# Patient Record
Sex: FEMALE | Race: WHITE | NOT HISPANIC OR LATINO | Employment: OTHER | URBAN - METROPOLITAN AREA
[De-identification: names, ages, dates, MRNs, and addresses within clinical notes are randomized per-mention and may not be internally consistent; named-entity substitution may affect disease eponyms.]

---

## 2023-02-03 PROBLEM — E78.5 HYPERLIPIDEMIA: Status: ACTIVE | Noted: 2023-02-03

## 2023-02-03 PROBLEM — E78.00 HYPERCHOLESTEREMIA: Status: ACTIVE | Noted: 2023-02-03

## 2023-02-03 PROBLEM — H81.4 VERTIGO OF CENTRAL ORIGIN, UNSPECIFIED LATERALITY: Status: ACTIVE | Noted: 2023-02-03

## 2023-02-03 PROBLEM — B35.1 ONYCHOMYCOSIS OF TOENAIL: Status: ACTIVE | Noted: 2023-02-03

## 2023-02-03 PROBLEM — M50.90 CERVICAL DISC DISEASE: Status: ACTIVE | Noted: 2023-02-03

## 2023-02-03 PROBLEM — F41.9 ANXIETY: Status: ACTIVE | Noted: 2023-02-03

## 2023-02-03 PROBLEM — R53.83 OTHER FATIGUE: Status: ACTIVE | Noted: 2023-02-03

## 2023-02-03 PROBLEM — F51.04 PSYCHOPHYSIOLOGICAL INSOMNIA: Status: ACTIVE | Noted: 2023-02-03

## 2023-02-03 PROBLEM — H51.9 EYE MOVEMENT ABNORMALITY: Status: ACTIVE | Noted: 2023-02-03

## 2023-02-03 PROBLEM — R74.01 ELEVATED TRANSAMINASE LEVEL: Status: ACTIVE | Noted: 2023-02-03

## 2023-02-03 PROBLEM — H93.3X3: Status: ACTIVE | Noted: 2023-02-03

## 2023-02-03 PROBLEM — E11.9 DIABETES MELLITUS (MULTI): Status: ACTIVE | Noted: 2023-02-03

## 2023-02-03 PROBLEM — H49.883: Status: ACTIVE | Noted: 2023-02-03

## 2023-02-03 PROBLEM — H53.002 LAZY EYE OF LEFT SIDE: Status: ACTIVE | Noted: 2023-02-03

## 2023-02-03 PROBLEM — H25.13 CATARACT, NUCLEAR SCLEROTIC, BOTH EYES: Status: ACTIVE | Noted: 2023-02-03

## 2023-02-03 PROBLEM — K76.0 NONALCOHOLIC FATTY LIVER DISEASE: Status: ACTIVE | Noted: 2023-02-03

## 2023-02-03 PROBLEM — I25.10 ATHEROSCLEROTIC HEART DISEASE OF NATIVE CORONARY ARTERY WITHOUT ANGINA PECTORIS: Status: ACTIVE | Noted: 2023-02-03

## 2023-02-03 PROBLEM — R42 LIGHTHEADEDNESS: Status: ACTIVE | Noted: 2023-02-03

## 2023-02-03 PROBLEM — E83.52 SERUM CALCIUM ELEVATED: Status: ACTIVE | Noted: 2023-02-03

## 2023-02-03 PROBLEM — N76.4 BOIL, LABIUM: Status: ACTIVE | Noted: 2023-02-03

## 2023-02-03 PROBLEM — I10 ESSENTIAL HYPERTENSION, BENIGN: Status: ACTIVE | Noted: 2023-02-03

## 2023-02-03 PROBLEM — L02.215 PERINEAL ABSCESS: Status: ACTIVE | Noted: 2023-02-03

## 2023-02-03 PROBLEM — C69.92: Status: ACTIVE | Noted: 2023-02-03

## 2023-02-03 PROBLEM — H61.22 IMPACTED CERUMEN OF LEFT EAR: Status: ACTIVE | Noted: 2023-02-03

## 2023-02-03 PROBLEM — E87.5 HYPERKALEMIA: Status: ACTIVE | Noted: 2023-02-03

## 2023-02-03 PROBLEM — R74.8 ABNORMAL LIVER ENZYMES: Status: ACTIVE | Noted: 2023-02-03

## 2023-02-03 PROBLEM — G93.9: Status: ACTIVE | Noted: 2023-02-03

## 2023-02-03 PROBLEM — Z85.840 HISTORY OF MALIGNANT MELANOMA OF EYE: Status: ACTIVE | Noted: 2023-02-03

## 2023-02-03 PROBLEM — D49.6: Status: ACTIVE | Noted: 2023-02-03

## 2023-02-03 PROBLEM — E03.9 ACQUIRED HYPOTHYROIDISM: Status: ACTIVE | Noted: 2023-02-03

## 2023-02-03 PROBLEM — H52.203 ASTIGMATISM OF BOTH EYES: Status: ACTIVE | Noted: 2023-02-03

## 2023-02-03 PROBLEM — F41.8 DEPRESSION WITH ANXIETY: Status: ACTIVE | Noted: 2023-02-03

## 2023-02-03 PROBLEM — H52.00 HYPEROPIA: Status: ACTIVE | Noted: 2023-02-03

## 2023-02-03 PROBLEM — I10 HYPERTENSION: Status: ACTIVE | Noted: 2023-02-03

## 2023-02-03 PROBLEM — Z95.1 S/P CABG (CORONARY ARTERY BYPASS GRAFT): Status: ACTIVE | Noted: 2023-02-03

## 2023-02-03 RX ORDER — BLOOD SUGAR DIAGNOSTIC
STRIP MISCELLANEOUS
COMMUNITY
Start: 2022-01-27 | End: 2023-09-08 | Stop reason: SDUPTHER

## 2023-02-03 RX ORDER — METFORMIN HYDROCHLORIDE 500 MG/1
500 TABLET ORAL 2 TIMES DAILY
COMMUNITY
Start: 2019-05-21 | End: 2023-03-30 | Stop reason: SDUPTHER

## 2023-02-03 RX ORDER — DAPAGLIFLOZIN 10 MG/1
1 TABLET, FILM COATED ORAL DAILY
COMMUNITY
End: 2024-01-12 | Stop reason: SDUPTHER

## 2023-02-03 RX ORDER — LEVOTHYROXINE SODIUM 50 UG/1
50 TABLET ORAL DAILY
COMMUNITY
End: 2023-05-01 | Stop reason: SDUPTHER

## 2023-02-03 RX ORDER — LISINOPRIL 2.5 MG/1
1 TABLET ORAL DAILY
COMMUNITY
Start: 2018-09-26 | End: 2024-01-22 | Stop reason: SDUPTHER

## 2023-02-03 RX ORDER — ROSUVASTATIN CALCIUM 10 MG/1
1 TABLET, COATED ORAL NIGHTLY
COMMUNITY
Start: 2022-09-23 | End: 2023-09-18 | Stop reason: SDUPTHER

## 2023-02-03 RX ORDER — CLOPIDOGREL BISULFATE 75 MG/1
1 TABLET ORAL DAILY
COMMUNITY
Start: 2012-12-06 | End: 2023-09-18 | Stop reason: SDUPTHER

## 2023-02-03 RX ORDER — METOPROLOL SUCCINATE 25 MG/1
1 TABLET, EXTENDED RELEASE ORAL DAILY
COMMUNITY
Start: 2022-09-23 | End: 2023-03-17 | Stop reason: ALTCHOICE

## 2023-02-03 RX ORDER — ASPIRIN 81 MG/1
1 TABLET ORAL DAILY
COMMUNITY
Start: 2020-02-01 | End: 2023-03-17 | Stop reason: SINTOL

## 2023-02-03 RX ORDER — SPIRONOLACTONE 25 MG
1 TABLET ORAL DAILY
COMMUNITY

## 2023-03-17 ENCOUNTER — OFFICE VISIT (OUTPATIENT)
Dept: PRIMARY CARE | Facility: CLINIC | Age: 63
End: 2023-03-17
Payer: MEDICARE

## 2023-03-17 VITALS
HEIGHT: 55 IN | SYSTOLIC BLOOD PRESSURE: 126 MMHG | WEIGHT: 126.4 LBS | HEART RATE: 68 BPM | BODY MASS INDEX: 29.25 KG/M2 | OXYGEN SATURATION: 96 % | DIASTOLIC BLOOD PRESSURE: 70 MMHG

## 2023-03-17 DIAGNOSIS — I10 ESSENTIAL HYPERTENSION, BENIGN: ICD-10-CM

## 2023-03-17 DIAGNOSIS — E03.9 ACQUIRED HYPOTHYROIDISM: ICD-10-CM

## 2023-03-17 DIAGNOSIS — Z13.820 SCREENING FOR OSTEOPOROSIS: Primary | ICD-10-CM

## 2023-03-17 DIAGNOSIS — C69.92 MALIGNANT MELANOMA OF LEFT EYE (MULTI): ICD-10-CM

## 2023-03-17 DIAGNOSIS — Z12.31 ENCOUNTER FOR SCREENING MAMMOGRAM FOR BREAST CANCER: ICD-10-CM

## 2023-03-17 DIAGNOSIS — E11.9 TYPE 2 DIABETES MELLITUS WITHOUT COMPLICATION, WITHOUT LONG-TERM CURRENT USE OF INSULIN (MULTI): ICD-10-CM

## 2023-03-17 DIAGNOSIS — Z95.1 S/P CABG (CORONARY ARTERY BYPASS GRAFT): ICD-10-CM

## 2023-03-17 DIAGNOSIS — I25.10 ATHEROSCLEROSIS OF NATIVE CORONARY ARTERY OF NATIVE HEART WITHOUT ANGINA PECTORIS: ICD-10-CM

## 2023-03-17 DIAGNOSIS — F41.9 ANXIETY: ICD-10-CM

## 2023-03-17 DIAGNOSIS — Z78.0 ASYMPTOMATIC MENOPAUSAL STATE: ICD-10-CM

## 2023-03-17 DIAGNOSIS — G93.9 CHRONIC BRAIN DAMAGE: ICD-10-CM

## 2023-03-17 DIAGNOSIS — E78.2 MODERATE MIXED HYPERLIPIDEMIA NOT REQUIRING STATIN THERAPY: ICD-10-CM

## 2023-03-17 DIAGNOSIS — K76.0 NONALCOHOLIC FATTY LIVER DISEASE: ICD-10-CM

## 2023-03-17 DIAGNOSIS — H49.883 EXTERNAL OPHTHALMOPLEGIA OF BOTH EYES: ICD-10-CM

## 2023-03-17 DIAGNOSIS — Z00.00 ROUTINE GENERAL MEDICAL EXAMINATION AT HEALTH CARE FACILITY: ICD-10-CM

## 2023-03-17 PROBLEM — N76.4 BOIL, LABIUM: Status: RESOLVED | Noted: 2023-02-03 | Resolved: 2023-03-17

## 2023-03-17 PROBLEM — H61.22 IMPACTED CERUMEN OF LEFT EAR: Status: RESOLVED | Noted: 2023-02-03 | Resolved: 2023-03-17

## 2023-03-17 LAB — POC HEMOGLOBIN A1C: 7.2 % (ref 4.2–6.5)

## 2023-03-17 PROCEDURE — 3078F DIAST BP <80 MM HG: CPT | Performed by: NURSE PRACTITIONER

## 2023-03-17 PROCEDURE — 3074F SYST BP LT 130 MM HG: CPT | Performed by: NURSE PRACTITIONER

## 2023-03-17 PROCEDURE — 83036 HEMOGLOBIN GLYCOSYLATED A1C: CPT | Performed by: NURSE PRACTITIONER

## 2023-03-17 PROCEDURE — G0439 PPPS, SUBSEQ VISIT: HCPCS | Performed by: NURSE PRACTITIONER

## 2023-03-17 PROCEDURE — 4010F ACE/ARB THERAPY RXD/TAKEN: CPT | Performed by: NURSE PRACTITIONER

## 2023-03-17 PROCEDURE — 1036F TOBACCO NON-USER: CPT | Performed by: NURSE PRACTITIONER

## 2023-03-17 RX ORDER — DAPAGLIFLOZIN 5 MG/1
5 TABLET, FILM COATED ORAL
COMMUNITY
Start: 2022-09-10 | End: 2023-03-17 | Stop reason: ALTCHOICE

## 2023-03-17 RX ORDER — MAGNESIUM GLUCONATE 27.5 (500)
27.5 TABLET ORAL
Qty: 90 TABLET | Refills: 0 | Status: SHIPPED | OUTPATIENT
Start: 2023-03-17

## 2023-03-17 ASSESSMENT — ACTIVITIES OF DAILY LIVING (ADL)
GROCERY_SHOPPING: NEEDS ASSISTANCE
DRESSING: INDEPENDENT
BATHING: INDEPENDENT
DOING_HOUSEWORK: NEEDS ASSISTANCE
TAKING_MEDICATION: INDEPENDENT
MANAGING_FINANCES: TOTAL CARE

## 2023-03-17 ASSESSMENT — ENCOUNTER SYMPTOMS
CONSTITUTIONAL NEGATIVE: 1
ARTHRALGIAS: 1
GASTROINTESTINAL NEGATIVE: 1
DEPRESSION: 0
RESPIRATORY NEGATIVE: 1
CARDIOVASCULAR NEGATIVE: 1
LOSS OF SENSATION IN FEET: 0
OCCASIONAL FEELINGS OF UNSTEADINESS: 0
SLEEP DISTURBANCE: 1
LIGHT-HEADEDNESS: 1

## 2023-03-17 ASSESSMENT — PATIENT HEALTH QUESTIONNAIRE - PHQ9
1. LITTLE INTEREST OR PLEASURE IN DOING THINGS: NOT AT ALL
SUM OF ALL RESPONSES TO PHQ9 QUESTIONS 1 AND 2: 0
2. FEELING DOWN, DEPRESSED OR HOPELESS: NOT AT ALL

## 2023-03-17 NOTE — ASSESSMENT & PLAN NOTE
Continues to struggle with anxiety due to having multiple complex health issues.  She is very anxious about eyes

## 2023-03-17 NOTE — ASSESSMENT & PLAN NOTE
A1c improved from previous.  No changes will farxiga   Will increase metformin to one tablet twice daily  Recheck in

## 2023-03-17 NOTE — PROGRESS NOTES
Subjective   Reason for Visit: Erin Amin is an 63 y.o. female here for a Medicare Wellness visit.          Reviewed all medications by prescribing practitioner or clinical pharmacist (such as prescriptions, OTCs, herbal therapies and supplements) and documented in the medical record.    HPI    Patient Care Team:  LIV Torres-CNP as PCP - General  John Moritz, MD as PCP - [employer] Advantage PCP     Review of Systems    Objective   Vitals:  There were no vitals taken for this visit.      Physical Exam    Assessment/Plan   Problem List Items Addressed This Visit    None

## 2023-03-17 NOTE — PATIENT INSTRUCTIONS
As we discussed today continue to work on eating healthy diet continue with your fish oil.  Due to the increased issues with your vision I placed a new referral for ophthalmology with a focus on the neurological aspect of your eyes.  Please schedule appointment.      Please continue to eat a healthy low-fat low-cholesterol diet try to increase your physical activity    I have added on magnesium and sent a prescription into [facility] for you to take nightly    increase your metformin to 1 full tab twice a day your A1c did improve    Follow-up in 3 months at that time we will complete fasting blood work    You are overdue for your mammogram and bone density scan which I have placed an order for

## 2023-03-17 NOTE — PROGRESS NOTES
"Subjective   Reason for Visit: Erin Amin is an 63 y.o. female here for a Medicare Wellness visit.     Past Medical, Surgical, and Family History reviewed and updated in chart.    Reviewed all medications by prescribing practitioner or clinical pharmacist (such as prescriptions, OTCs, herbal therapies and supplements) and documented in the medical record.    Pt here for routine  medicaare wellness and follow up on diabetes. She is struggling with her vision and history of eye surgeries.        Patient Care Team:  LIV Torres-CNP as PCP - General  John Moritz, MD as PCP - [employer] Medicare Advantage PCP     Review of Systems   Constitutional: Negative.    HENT: Negative.     Eyes:  Positive for visual disturbance.   Respiratory: Negative.     Cardiovascular: Negative.    Gastrointestinal: Negative.    Genitourinary: Negative.    Musculoskeletal:  Positive for arthralgias.   Skin: Negative.    Neurological:  Positive for light-headedness.   Psychiatric/Behavioral:  Positive for sleep disturbance.        Objective   Vitals:  /70   Pulse 68   Ht 1.372 m (4' 6\")   Wt 57.3 kg (126 lb 6.4 oz)   SpO2 96%   BMI 30.48 kg/m²       Physical Exam  Vitals reviewed.   Constitutional:       Appearance: Normal appearance. She is obese.   HENT:      Head: Normocephalic.      Nose: Nose normal.   Eyes:      Conjunctiva/sclera: Conjunctivae normal.   Cardiovascular:      Rate and Rhythm: Normal rate.      Heart sounds: Normal heart sounds.   Pulmonary:      Effort: Pulmonary effort is normal.      Breath sounds: Normal breath sounds.   Abdominal:      General: Bowel sounds are normal.      Palpations: Abdomen is soft.   Musculoskeletal:         General: Normal range of motion.      Cervical back: Normal range of motion.   Skin:     General: Skin is warm and dry.      Capillary Refill: Capillary refill takes less than 2 seconds.   Neurological:      General: No focal deficit present.      Mental Status: She is " alert.   Psychiatric:      Comments: Anxious about over health         Assessment/Plan   Problem List Items Addressed This Visit          Nervous    Chronic brain damage    Current Assessment & Plan     Pt continues to be concerned about her eye sight and her balance related to past injury.          Relevant Orders    Referral to Ophthalmology    External ophthalmoplegia of both eyes    Current Assessment & Plan     She continues to struggle with issues with vision. She is frustrated in regards to her vision.     Will place new referral for ophthalmologist / neuro ophthalmologist.     Pt declines referral at this time         Relevant Orders    Referral to Ophthalmology       Circulatory    Atherosclerotic heart disease of native coronary artery without angina pectoris    Current Assessment & Plan     Pt is followed by Dr. Alford. She has had recent testing. Does routine follow up.         Essential hypertension, benign    Current Assessment & Plan     Pt is on lisinopril and stable with blood pressure         Relevant Medications    magnesium gluconate (Magonate) 27.5 mg magne- sium (500 mg) tablet    S/P CABG (coronary artery bypass graft)    Current Assessment & Plan     Follows with Cardiology            Digestive    Nonalcoholic fatty liver disease    Current Assessment & Plan     On rosuvastatin .      Pt to eat low fat low cholesterol diet            Endocrine/Metabolic    Acquired hypothyroidism    Current Assessment & Plan     Pt denies any increased fatigue or constipation or increased hair loss    No changes to medications at this time           Diabetes mellitus (CMS/HCC)    Current Assessment & Plan     A1c improved from previous.  No changes will farxiga   Will increase metformin to one tablet twice daily  Recheck in          Relevant Medications    magnesium gluconate (Magonate) 27.5 mg magne- sium (500 mg) tablet    Other Relevant Orders    POCT glycosylated hemoglobin (Hb A1C) manually resulted     Referral to Ophthalmology       Other    Anxiety    Current Assessment & Plan     Continues to struggle with anxiety due to having multiple complex health issues.  She is very anxious about eyes         Relevant Medications    magnesium gluconate (Magonate) 27.5 mg magne- sium (500 mg) tablet    Hyperlipidemia    Current Assessment & Plan     Will continue on rousovastatin. Blood work is current         Malignant melanoma of left eye (CMS/HCC)    Current Assessment & Plan     Discussed at length she is very concerned about her vision and history related to her eyes         Relevant Orders    Referral to Ophthalmology     Other Visit Diagnoses       Screening for osteoporosis    -  Primary    Relevant Orders    XR DEXA bone density    Asymptomatic menopausal state        Relevant Orders    XR DEXA bone density    Encounter for screening mammogram for breast cancer        Relevant Orders    BI mammo bilateral screening tomosynthesis    Routine general medical examination at health care facility

## 2023-03-17 NOTE — ASSESSMENT & PLAN NOTE
She continues to struggle with issues with vision. She is frustrated in regards to her vision.     Will place new referral for ophthalmologist / neuro ophthalmologist.     Pt declines referral at this time

## 2023-03-17 NOTE — ASSESSMENT & PLAN NOTE
Pt denies any increased fatigue or constipation or increased hair loss    No changes to medications at this time

## 2023-03-29 ENCOUNTER — TELEPHONE (OUTPATIENT)
Dept: PHARMACY | Facility: HOSPITAL | Age: 63
End: 2023-03-29
Payer: MEDICARE

## 2023-03-29 ENCOUNTER — TELEPHONE (OUTPATIENT)
Dept: PRIMARY CARE | Facility: CLINIC | Age: 63
End: 2023-03-29
Payer: MEDICARE

## 2023-03-29 DIAGNOSIS — E11.9 TYPE 2 DIABETES MELLITUS WITHOUT COMPLICATION, WITHOUT LONG-TERM CURRENT USE OF INSULIN (MULTI): ICD-10-CM

## 2023-03-29 NOTE — TELEPHONE ENCOUNTER
Rx Refill Request Telephone Encounter  PLEASE CALL  Name:  Erin Amin  :  [id number]  Medication Name:  METFORMIN 500mg 1 tab every day 90 day   PT is out due to updated dose   Specific Pharmacy location:  [facility]    Best number to reach patient:  [phone]

## 2023-03-29 NOTE — TELEPHONE ENCOUNTER
Patient called previously inquiring about status of this month's Farxiga medication shipment through  Home Delivery service. Stated she had enough medication to get her through Friday, 3/31.    This author looked into shipment status and scheduled 'rush' shipment for Thursday, 3/30. Autofill settings for medication have been adjusted for future medication refills.

## 2023-03-30 RX ORDER — METFORMIN HYDROCHLORIDE 500 MG/1
500 TABLET ORAL 2 TIMES DAILY
Qty: 180 TABLET | Refills: 1 | Status: SHIPPED | OUTPATIENT
Start: 2023-03-30 | End: 2023-10-03 | Stop reason: SDUPTHER

## 2023-03-30 NOTE — TELEPHONE ENCOUNTER
Pt states Ivonne has not received the refill. Pt states she needs Metformin 500mg BID (once in am and once in pm)

## 2023-05-01 ENCOUNTER — TELEPHONE (OUTPATIENT)
Dept: PRIMARY CARE | Facility: CLINIC | Age: 63
End: 2023-05-01
Payer: MEDICARE

## 2023-05-01 DIAGNOSIS — E03.9 ACQUIRED HYPOTHYROIDISM: ICD-10-CM

## 2023-05-01 RX ORDER — LEVOTHYROXINE SODIUM 50 UG/1
50 TABLET ORAL DAILY
Qty: 90 TABLET | Refills: 0 | Status: SHIPPED | OUTPATIENT
Start: 2023-05-01 | End: 2023-08-07 | Stop reason: SDUPTHER

## 2023-05-01 NOTE — TELEPHONE ENCOUNTER
Rx Refill Request Telephone Encounter    Name:  Erin Amin  :  [id number]  Medication Name:    LEVOTHYROXINE 50 MCG 50MG Q D 90 DAY SUPPLY  Specific Pharmacy location:  [facility]   Date of last appointment:  2023  Date of next appointment:  2023  Best number to reach patient:  [phone]

## 2023-08-04 ENCOUNTER — TELEPHONE (OUTPATIENT)
Dept: PRIMARY CARE | Facility: CLINIC | Age: 63
End: 2023-08-04
Payer: MEDICARE

## 2023-08-04 DIAGNOSIS — E03.9 ACQUIRED HYPOTHYROIDISM: ICD-10-CM

## 2023-08-04 NOTE — TELEPHONE ENCOUNTER
Rx Refill Request Telephone Encounter    Name:  Erin Amin  :  [id number]  Medication Name:      Levothyroxine 50 mcg 1 tab every day - 90day     Specific Pharmacy location:  [facility]  Date of last appointment:  3/17/2023  Date of next appointment:  2023  Best number to reach patient:  [phone]

## 2023-08-07 RX ORDER — LEVOTHYROXINE SODIUM 50 UG/1
50 TABLET ORAL DAILY
Qty: 90 TABLET | Refills: 0 | Status: SHIPPED | OUTPATIENT
Start: 2023-08-07 | End: 2023-09-18 | Stop reason: ALTCHOICE

## 2023-09-01 DIAGNOSIS — E11.9 TYPE 2 DIABETES MELLITUS WITHOUT COMPLICATION, WITHOUT LONG-TERM CURRENT USE OF INSULIN (MULTI): Primary | ICD-10-CM

## 2023-09-08 RX ORDER — LANCETS
EACH MISCELLANEOUS
Qty: 100 EACH | Refills: 3 | Status: SHIPPED | OUTPATIENT
Start: 2023-09-08 | End: 2023-09-18 | Stop reason: ALTCHOICE

## 2023-09-08 RX ORDER — BLOOD SUGAR DIAGNOSTIC
1 STRIP MISCELLANEOUS DAILY
Qty: 100 STRIP | Refills: 2 | Status: SHIPPED | OUTPATIENT
Start: 2023-09-08

## 2023-09-18 ENCOUNTER — LAB (OUTPATIENT)
Dept: LAB | Facility: LAB | Age: 63
End: 2023-09-18
Payer: MEDICARE

## 2023-09-18 ENCOUNTER — OFFICE VISIT (OUTPATIENT)
Dept: PRIMARY CARE | Facility: CLINIC | Age: 63
End: 2023-09-18
Payer: MEDICARE

## 2023-09-18 VITALS
BODY MASS INDEX: 28.93 KG/M2 | OXYGEN SATURATION: 96 % | WEIGHT: 125 LBS | HEART RATE: 68 BPM | DIASTOLIC BLOOD PRESSURE: 74 MMHG | HEIGHT: 55 IN | SYSTOLIC BLOOD PRESSURE: 124 MMHG

## 2023-09-18 DIAGNOSIS — H49.883 EXTERNAL OPHTHALMOPLEGIA OF BOTH EYES: ICD-10-CM

## 2023-09-18 DIAGNOSIS — Z78.0 ASYMPTOMATIC MENOPAUSAL STATE: ICD-10-CM

## 2023-09-18 DIAGNOSIS — F41.9 ANXIETY: ICD-10-CM

## 2023-09-18 DIAGNOSIS — Z95.1 S/P CABG (CORONARY ARTERY BYPASS GRAFT): ICD-10-CM

## 2023-09-18 DIAGNOSIS — Z12.31 SCREENING MAMMOGRAM FOR BREAST CANCER: ICD-10-CM

## 2023-09-18 DIAGNOSIS — E03.9 ACQUIRED HYPOTHYROIDISM: ICD-10-CM

## 2023-09-18 DIAGNOSIS — N95.1 POST MENOPAUSAL SYNDROME: ICD-10-CM

## 2023-09-18 DIAGNOSIS — E78.2 MIXED HYPERLIPIDEMIA: ICD-10-CM

## 2023-09-18 DIAGNOSIS — E11.9 TYPE 2 DIABETES MELLITUS WITHOUT COMPLICATION, WITH LONG-TERM CURRENT USE OF INSULIN (MULTI): Primary | ICD-10-CM

## 2023-09-18 DIAGNOSIS — Z79.4 TYPE 2 DIABETES MELLITUS WITHOUT COMPLICATION, WITH LONG-TERM CURRENT USE OF INSULIN (MULTI): Primary | ICD-10-CM

## 2023-09-18 DIAGNOSIS — I10 ESSENTIAL HYPERTENSION, BENIGN: ICD-10-CM

## 2023-09-18 DIAGNOSIS — C69.92 MALIGNANT MELANOMA OF LEFT EYE (MULTI): ICD-10-CM

## 2023-09-18 PROBLEM — E11.65 TYPE 2 DIABETES MELLITUS WITH HYPERGLYCEMIA, WITHOUT LONG-TERM CURRENT USE OF INSULIN (MULTI): Status: ACTIVE | Noted: 2022-07-12

## 2023-09-18 PROBLEM — H52.7 REFRACTION ERROR: Status: ACTIVE | Noted: 2022-07-12

## 2023-09-18 LAB
ALANINE AMINOTRANSFERASE (SGPT) (U/L) IN SER/PLAS: 38 U/L (ref 7–45)
ALBUMIN (G/DL) IN SER/PLAS: 4.6 G/DL (ref 3.4–5)
ALKALINE PHOSPHATASE (U/L) IN SER/PLAS: 73 U/L (ref 33–136)
ANION GAP IN SER/PLAS: 16 MMOL/L (ref 10–20)
ASPARTATE AMINOTRANSFERASE (SGOT) (U/L) IN SER/PLAS: 22 U/L (ref 9–39)
BILIRUBIN TOTAL (MG/DL) IN SER/PLAS: 0.9 MG/DL (ref 0–1.2)
CALCIUM (MG/DL) IN SER/PLAS: 10 MG/DL (ref 8.6–10.3)
CARBON DIOXIDE, TOTAL (MMOL/L) IN SER/PLAS: 22 MMOL/L (ref 21–32)
CHLORIDE (MMOL/L) IN SER/PLAS: 104 MMOL/L (ref 98–107)
CHOLESTEROL (MG/DL) IN SER/PLAS: 98 MG/DL (ref 0–199)
CHOLESTEROL IN HDL (MG/DL) IN SER/PLAS: 31.4 MG/DL
CHOLESTEROL/HDL RATIO: 3.1
CREATININE (MG/DL) IN SER/PLAS: 0.71 MG/DL (ref 0.5–1.05)
GFR FEMALE: >90 ML/MIN/1.73M2
GLUCOSE (MG/DL) IN SER/PLAS: 106 MG/DL (ref 74–99)
LDL: 42 MG/DL (ref 0–99)
POC HEMOGLOBIN A1C: 7.1 % (ref 4.2–6.5)
POTASSIUM (MMOL/L) IN SER/PLAS: 4.4 MMOL/L (ref 3.5–5.3)
PROTEIN TOTAL: 6.9 G/DL (ref 6.4–8.2)
SODIUM (MMOL/L) IN SER/PLAS: 138 MMOL/L (ref 136–145)
THYROTROPIN (MIU/L) IN SER/PLAS BY DETECTION LIMIT <= 0.05 MIU/L: 2.67 MIU/L (ref 0.44–3.98)
TRIGLYCERIDE (MG/DL) IN SER/PLAS: 124 MG/DL (ref 0–149)
UREA NITROGEN (MG/DL) IN SER/PLAS: 17 MG/DL (ref 6–23)
VLDL: 25 MG/DL (ref 0–40)

## 2023-09-18 PROCEDURE — 3074F SYST BP LT 130 MM HG: CPT | Performed by: NURSE PRACTITIONER

## 2023-09-18 PROCEDURE — 1036F TOBACCO NON-USER: CPT | Performed by: NURSE PRACTITIONER

## 2023-09-18 PROCEDURE — 84443 ASSAY THYROID STIM HORMONE: CPT

## 2023-09-18 PROCEDURE — 80061 LIPID PANEL: CPT

## 2023-09-18 PROCEDURE — 80053 COMPREHEN METABOLIC PANEL: CPT

## 2023-09-18 PROCEDURE — 4010F ACE/ARB THERAPY RXD/TAKEN: CPT | Performed by: NURSE PRACTITIONER

## 2023-09-18 PROCEDURE — 99214 OFFICE O/P EST MOD 30 MIN: CPT | Performed by: NURSE PRACTITIONER

## 2023-09-18 PROCEDURE — 83036 HEMOGLOBIN GLYCOSYLATED A1C: CPT | Performed by: NURSE PRACTITIONER

## 2023-09-18 PROCEDURE — 36415 COLL VENOUS BLD VENIPUNCTURE: CPT

## 2023-09-18 PROCEDURE — 3078F DIAST BP <80 MM HG: CPT | Performed by: NURSE PRACTITIONER

## 2023-09-18 RX ORDER — ROSUVASTATIN CALCIUM 10 MG/1
10 TABLET, COATED ORAL NIGHTLY
Qty: 90 TABLET | Refills: 2 | Status: SHIPPED | OUTPATIENT
Start: 2023-09-18 | End: 2024-03-21 | Stop reason: SDUPTHER

## 2023-09-18 RX ORDER — ASPIRIN 81 MG/1
81 TABLET ORAL
COMMUNITY
Start: 2012-04-12 | End: 2024-03-29

## 2023-09-18 RX ORDER — LANCETS 33 GAUGE
EACH MISCELLANEOUS
COMMUNITY
Start: 2023-09-08

## 2023-09-18 RX ORDER — CLOPIDOGREL BISULFATE 75 MG/1
75 TABLET ORAL DAILY
Qty: 90 TABLET | Refills: 2 | Status: SHIPPED | OUTPATIENT
Start: 2023-09-18 | End: 2024-03-21 | Stop reason: SDUPTHER

## 2023-09-18 ASSESSMENT — PATIENT HEALTH QUESTIONNAIRE - PHQ9
SUM OF ALL RESPONSES TO PHQ9 QUESTIONS 1 AND 2: 0
2. FEELING DOWN, DEPRESSED OR HOPELESS: NOT AT ALL
1. LITTLE INTEREST OR PLEASURE IN DOING THINGS: NOT AT ALL
2. FEELING DOWN, DEPRESSED OR HOPELESS: NOT AT ALL
1. LITTLE INTEREST OR PLEASURE IN DOING THINGS: NOT AT ALL
SUM OF ALL RESPONSES TO PHQ9 QUESTIONS 1 AND 2: 0

## 2023-09-18 ASSESSMENT — ENCOUNTER SYMPTOMS
MUSCULOSKELETAL NEGATIVE: 1
CONSTITUTIONAL NEGATIVE: 1
RESPIRATORY NEGATIVE: 1
PSYCHIATRIC NEGATIVE: 1
GASTROINTESTINAL NEGATIVE: 1

## 2023-09-18 ASSESSMENT — COLUMBIA-SUICIDE SEVERITY RATING SCALE - C-SSRS
6. HAVE YOU EVER DONE ANYTHING, STARTED TO DO ANYTHING, OR PREPARED TO DO ANYTHING TO END YOUR LIFE?: NO
1. IN THE PAST MONTH, HAVE YOU WISHED YOU WERE DEAD OR WISHED YOU COULD GO TO SLEEP AND NOT WAKE UP?: NO
2. HAVE YOU ACTUALLY HAD ANY THOUGHTS OF KILLING YOURSELF?: NO

## 2023-09-18 NOTE — PROGRESS NOTES
"Subjective   Patient ID: Erin Amin is a 63 y.o. female who presents for Follow-up (6 month).    Here for routine 6 month follow up    She was having some chest discomfort: she called and she has Appt with cardiologist Dr. Alford 9/28/23. She states once she stopped lifting it improved.  She denies any current chest pain.               Review of Systems   Constitutional: Negative.    Eyes:         Having difficulty with her vision. She needs to see ophthalmologist    Respiratory: Negative.     Cardiovascular:         Had a few episodes of chest discomfort not currently having has scheduled appt with cardiology   Gastrointestinal: Negative.    Genitourinary: Negative.    Musculoskeletal: Negative.    Skin: Negative.    Psychiatric/Behavioral: Negative.         Objective   /74   Pulse 68   Ht 1.372 m (4' 6\")   Wt 56.7 kg (125 lb)   SpO2 96%   BMI 30.14 kg/m²     Physical Exam  Constitutional:       Appearance: She is obese.   HENT:      Head: Normocephalic.   Cardiovascular:      Rate and Rhythm: Normal rate.      Heart sounds: Normal heart sounds.   Pulmonary:      Effort: Pulmonary effort is normal.      Breath sounds: Normal breath sounds.   Musculoskeletal:         General: Normal range of motion.   Skin:     Capillary Refill: Capillary refill takes less than 2 seconds.   Neurological:      General: No focal deficit present.      Mental Status: She is alert and oriented to person, place, and time.   Psychiatric:         Mood and Affect: Mood normal.         Behavior: Behavior normal.         Thought Content: Thought content normal.         Judgment: Judgment normal.         Assessment/Plan   Problem List Items Addressed This Visit       Acquired hypothyroidism    Relevant Orders    TSH with reflex to Free T4 if abnormal    Anxiety    Type 2 diabetes mellitus with hyperglycemia, without long-term current use of insulin (CMS/[facility]) - Primary    Relevant Medications    OneTouch Delica Plus Lancet 33 gauge " misc    Essential hypertension, benign    Relevant Orders    Comprehensive Metabolic Panel    Lipid Panel    External ophthalmoplegia of both eyes    Relevant Orders    Referral to Ophthalmology    Hyperlipidemia    Relevant Medications    rosuvastatin (Crestor) 10 mg tablet    Other Relevant Orders    Comprehensive Metabolic Panel    Lipid Panel    Malignant melanoma of left eye (CMS/HCC)    Relevant Orders    Referral to Ophthalmology    S/P CABG (coronary artery bypass graft)    Relevant Medications    aspirin 81 mg EC tablet    clopidogrel (Plavix) 75 mg tablet

## 2023-09-18 NOTE — PATIENT INSTRUCTIONS
Improvement in A1c: Your A1c result today is 7.1, which shows notable progress. Keep it up!    Dietary Recommendations: For maintaining and further improving this progress, it's essential to stick to a healthy, well-balanced diabetic diet. Here's a short example:    Breakfast: Whole grain oatmeal with berries and a sprinkle of nuts.  Lunch: Grilled chicken salad with mixed greens, cherry tomatoes, cucumbers, and a light vinaigrette.  Dinner: Baked salmon, quinoa, and steamed broccoli.  Snack: A small handful of almonds or carrot sticks with hummus.  Cardiac Health: I'm relieved you've scheduled an appointment with Dr. Alford, your cardiologist, given the sporadic chest pains you've experienced. But remember, if you experience any more chest pains or if they become more severe, it's crucial to treat them as emergencies. Call 911 or head directly to the ER.    Upcoming Tests: I've ordered your routine blood work, mammogram, and bone density tests. It's important to ensure every aspect of your health is being monitored.    Next Appointment: [place] your calendar for a follow-up in three months for your Medicare wellness visit.

## 2023-09-22 ENCOUNTER — TELEPHONE (OUTPATIENT)
Dept: PRIMARY CARE | Facility: CLINIC | Age: 63
End: 2023-09-22
Payer: MEDICARE

## 2023-09-22 NOTE — TELEPHONE ENCOUNTER
Result Communication    No results found from the In Basket message.    4:59 PM      Results were successfully communicated with the patient and they acknowledged their understanding.

## 2023-10-03 ENCOUNTER — TELEPHONE (OUTPATIENT)
Dept: PRIMARY CARE | Facility: CLINIC | Age: 63
End: 2023-10-03
Payer: MEDICARE

## 2023-10-03 DIAGNOSIS — E11.9 TYPE 2 DIABETES MELLITUS WITHOUT COMPLICATION, WITHOUT LONG-TERM CURRENT USE OF INSULIN (MULTI): ICD-10-CM

## 2023-10-03 RX ORDER — METFORMIN HYDROCHLORIDE 500 MG/1
500 TABLET ORAL 2 TIMES DAILY
Qty: 180 TABLET | Refills: 1 | Status: SHIPPED | OUTPATIENT
Start: 2023-10-03 | End: 2024-03-21 | Stop reason: SDUPTHER

## 2023-10-03 NOTE — TELEPHONE ENCOUNTER
PT IS OUT OF MEDICATION AND NEEDS FOR DINNER TONIGHT     Rx Refill Request Telephone Encounter    Name:  Erin Amin  :  [id number]  Medication Name:    METFORMIN 500MG BID 90 DAY SUPPLY  Specific Pharmacy location:  [facility]   Date of last appointment:  2023  Date of next appointment:  N/A   Best number to reach patient:  [phone]

## 2023-10-18 ENCOUNTER — PHARMACY VISIT (OUTPATIENT)
Dept: PHARMACY | Facility: CLINIC | Age: 63
End: 2023-10-18
Payer: MEDICARE

## 2023-10-18 PROCEDURE — RXMED WILLOW AMBULATORY MEDICATION CHARGE

## 2023-10-30 ENCOUNTER — TELEPHONE (OUTPATIENT)
Dept: PRIMARY CARE | Facility: CLINIC | Age: 63
End: 2023-10-30
Payer: MEDICARE

## 2023-10-30 DIAGNOSIS — E03.9 ACQUIRED HYPOTHYROIDISM: ICD-10-CM

## 2023-10-30 RX ORDER — LEVOTHYROXINE SODIUM 50 UG/1
50 TABLET ORAL
Qty: 90 TABLET | Refills: 1 | Status: SHIPPED | OUTPATIENT
Start: 2023-10-30 | End: 2024-03-21 | Stop reason: SDUPTHER

## 2023-10-30 NOTE — TELEPHONE ENCOUNTER
Rx Refill Request Telephone Encounter    Name:  Erin Amin  :  [id number]  Medication Name:    levothyroxine (Synthroid, Levoxyl) 50 mcg tablet  Take 1 tablet (50 mcg) by mouth once daily.  - 90 day  Specific Pharmacy location:  [facility]  Date of last appointment:  2023  Date of next appointment:  na  Best number to reach patient:  [phone]

## 2023-11-12 ENCOUNTER — PHARMACY VISIT (OUTPATIENT)
Dept: PHARMACY | Facility: CLINIC | Age: 63
End: 2023-11-12
Payer: MEDICARE

## 2023-11-12 PROCEDURE — RXMED WILLOW AMBULATORY MEDICATION CHARGE

## 2023-11-18 ENCOUNTER — APPOINTMENT (OUTPATIENT)
Dept: RADIOLOGY | Facility: HOSPITAL | Age: 63
End: 2023-11-18
Payer: MEDICARE

## 2023-12-12 PROCEDURE — RXMED WILLOW AMBULATORY MEDICATION CHARGE

## 2023-12-13 ENCOUNTER — PHARMACY VISIT (OUTPATIENT)
Dept: PHARMACY | Facility: CLINIC | Age: 63
End: 2023-12-13
Payer: MEDICARE

## 2024-01-11 DIAGNOSIS — E11.65 TYPE 2 DIABETES MELLITUS WITH HYPERGLYCEMIA, WITHOUT LONG-TERM CURRENT USE OF INSULIN (MULTI): Primary | ICD-10-CM

## 2024-01-11 PROBLEM — H52.00 HYPEROPIA: Status: ACTIVE | Noted: 2024-01-11

## 2024-01-11 PROBLEM — H31.002 RETINAL SCAR OF LEFT EYE: Status: ACTIVE | Noted: 2022-07-12

## 2024-01-12 RX ORDER — DAPAGLIFLOZIN 10 MG/1
10 TABLET, FILM COATED ORAL
Qty: 90 TABLET | Refills: 1 | Status: SHIPPED | OUTPATIENT
Start: 2024-01-12 | End: 2024-03-21 | Stop reason: SDUPTHER

## 2024-01-15 ENCOUNTER — PHARMACY VISIT (OUTPATIENT)
Dept: PHARMACY | Facility: CLINIC | Age: 64
End: 2024-01-15
Payer: MEDICARE

## 2024-01-15 PROCEDURE — RXMED WILLOW AMBULATORY MEDICATION CHARGE

## 2024-01-22 ENCOUNTER — TELEPHONE (OUTPATIENT)
Dept: PRIMARY CARE | Facility: CLINIC | Age: 64
End: 2024-01-22
Payer: MEDICARE

## 2024-01-22 DIAGNOSIS — I10 ESSENTIAL HYPERTENSION, BENIGN: ICD-10-CM

## 2024-01-22 NOTE — TELEPHONE ENCOUNTER
MEDICATION REFILL    Pharmacy Name:   [facility]  Medication requested          Lisinopril     23.5 mg  Dosage    1 x daily  Quantity     90 days   Allergies   none given  Date of last visit    09/18/2023 (JEREMY, cnp)  Date of Next Appointment    03/21/2024                                                ( Dr SUKHDEV Jacobsen)

## 2024-01-24 RX ORDER — LISINOPRIL 2.5 MG/1
2.5 TABLET ORAL DAILY
Qty: 90 TABLET | Refills: 0 | Status: SHIPPED | OUTPATIENT
Start: 2024-01-24 | End: 2024-03-21 | Stop reason: SDUPTHER

## 2024-03-21 ENCOUNTER — OFFICE VISIT (OUTPATIENT)
Dept: PRIMARY CARE | Facility: CLINIC | Age: 64
End: 2024-03-21
Payer: MEDICARE

## 2024-03-21 ENCOUNTER — LAB (OUTPATIENT)
Dept: LAB | Facility: LAB | Age: 64
End: 2024-03-21
Payer: MEDICARE

## 2024-03-21 VITALS
OXYGEN SATURATION: 97 % | BODY MASS INDEX: 28.53 KG/M2 | DIASTOLIC BLOOD PRESSURE: 77 MMHG | HEIGHT: 55 IN | WEIGHT: 123.25 LBS | HEART RATE: 69 BPM | TEMPERATURE: 97.4 F | SYSTOLIC BLOOD PRESSURE: 126 MMHG

## 2024-03-21 DIAGNOSIS — Z00.00 MEDICARE ANNUAL WELLNESS VISIT, SUBSEQUENT: Primary | ICD-10-CM

## 2024-03-21 DIAGNOSIS — Z78.0 ASYMPTOMATIC MENOPAUSAL STATE: ICD-10-CM

## 2024-03-21 DIAGNOSIS — E03.9 ACQUIRED HYPOTHYROIDISM: ICD-10-CM

## 2024-03-21 DIAGNOSIS — Z12.12 SCREENING FOR COLORECTAL CANCER: ICD-10-CM

## 2024-03-21 DIAGNOSIS — E11.65 TYPE 2 DIABETES MELLITUS WITH HYPERGLYCEMIA, WITHOUT LONG-TERM CURRENT USE OF INSULIN (MULTI): ICD-10-CM

## 2024-03-21 DIAGNOSIS — E78.2 MIXED HYPERLIPIDEMIA: ICD-10-CM

## 2024-03-21 DIAGNOSIS — I10 ESSENTIAL HYPERTENSION, BENIGN: ICD-10-CM

## 2024-03-21 DIAGNOSIS — Z11.59 NEED FOR HEPATITIS C SCREENING TEST: ICD-10-CM

## 2024-03-21 DIAGNOSIS — Z12.4 SCREENING FOR CERVICAL CANCER: ICD-10-CM

## 2024-03-21 DIAGNOSIS — Z12.31 ENCOUNTER FOR SCREENING MAMMOGRAM FOR BREAST CANCER: ICD-10-CM

## 2024-03-21 DIAGNOSIS — Z95.1 S/P CABG (CORONARY ARTERY BYPASS GRAFT): ICD-10-CM

## 2024-03-21 DIAGNOSIS — Z12.11 SCREENING FOR COLORECTAL CANCER: ICD-10-CM

## 2024-03-21 LAB
ALBUMIN SERPL BCP-MCNC: 5.3 G/DL (ref 3.4–5)
ALP SERPL-CCNC: 84 U/L (ref 33–136)
ALT SERPL W P-5'-P-CCNC: 34 U/L (ref 7–45)
ANION GAP SERPL CALC-SCNC: 17 MMOL/L (ref 10–20)
AST SERPL W P-5'-P-CCNC: 20 U/L (ref 9–39)
BILIRUB SERPL-MCNC: 0.8 MG/DL (ref 0–1.2)
BUN SERPL-MCNC: 13 MG/DL (ref 6–23)
CALCIUM SERPL-MCNC: 10.6 MG/DL (ref 8.6–10.3)
CHLORIDE SERPL-SCNC: 101 MMOL/L (ref 98–107)
CHOLEST SERPL-MCNC: 126 MG/DL (ref 0–199)
CHOLESTEROL/HDL RATIO: 2.9
CK SERPL-CCNC: 45 U/L (ref 0–215)
CO2 SERPL-SCNC: 25 MMOL/L (ref 21–32)
CREAT SERPL-MCNC: 0.78 MG/DL (ref 0.5–1.05)
EGFRCR SERPLBLD CKD-EPI 2021: 85 ML/MIN/1.73M*2
GLUCOSE SERPL-MCNC: 127 MG/DL (ref 74–99)
HCV AB SER QL: NONREACTIVE
HDLC SERPL-MCNC: 43.6 MG/DL
LDLC SERPL CALC-MCNC: 54 MG/DL
NON HDL CHOLESTEROL: 82 MG/DL (ref 0–149)
POC ALBUMIN /CREATININE RATIO MANUALLY ENTERED: ABNORMAL UG/MG CREAT
POC HEMOGLOBIN A1C: 6.9 % (ref 4.2–6.5)
POC URINE ALBUMIN: 10 MG/L
POC URINE CREATININE: 10 MG/DL
POTASSIUM SERPL-SCNC: 4.4 MMOL/L (ref 3.5–5.3)
PROT SERPL-MCNC: 7.9 G/DL (ref 6.4–8.2)
SODIUM SERPL-SCNC: 139 MMOL/L (ref 136–145)
TRIGL SERPL-MCNC: 143 MG/DL (ref 0–149)
TSH SERPL-ACNC: 2.21 MIU/L (ref 0.44–3.98)
VLDL: 29 MG/DL (ref 0–40)

## 2024-03-21 PROCEDURE — 36415 COLL VENOUS BLD VENIPUNCTURE: CPT

## 2024-03-21 PROCEDURE — 3048F LDL-C <100 MG/DL: CPT | Performed by: FAMILY MEDICINE

## 2024-03-21 PROCEDURE — 82044 UR ALBUMIN SEMIQUANTITATIVE: CPT | Performed by: FAMILY MEDICINE

## 2024-03-21 PROCEDURE — 83036 HEMOGLOBIN GLYCOSYLATED A1C: CPT | Performed by: FAMILY MEDICINE

## 2024-03-21 PROCEDURE — 99214 OFFICE O/P EST MOD 30 MIN: CPT | Performed by: FAMILY MEDICINE

## 2024-03-21 PROCEDURE — 80061 LIPID PANEL: CPT

## 2024-03-21 PROCEDURE — G0439 PPPS, SUBSEQ VISIT: HCPCS | Performed by: FAMILY MEDICINE

## 2024-03-21 PROCEDURE — 80053 COMPREHEN METABOLIC PANEL: CPT

## 2024-03-21 PROCEDURE — 3074F SYST BP LT 130 MM HG: CPT | Performed by: FAMILY MEDICINE

## 2024-03-21 PROCEDURE — 4010F ACE/ARB THERAPY RXD/TAKEN: CPT | Performed by: FAMILY MEDICINE

## 2024-03-21 PROCEDURE — 86803 HEPATITIS C AB TEST: CPT

## 2024-03-21 PROCEDURE — 84443 ASSAY THYROID STIM HORMONE: CPT

## 2024-03-21 PROCEDURE — 1036F TOBACCO NON-USER: CPT | Performed by: FAMILY MEDICINE

## 2024-03-21 PROCEDURE — 82550 ASSAY OF CK (CPK): CPT

## 2024-03-21 PROCEDURE — 3078F DIAST BP <80 MM HG: CPT | Performed by: FAMILY MEDICINE

## 2024-03-21 RX ORDER — LEVOTHYROXINE SODIUM 50 UG/1
50 TABLET ORAL
Qty: 90 TABLET | Refills: 1 | Status: SHIPPED | OUTPATIENT
Start: 2024-03-21

## 2024-03-21 RX ORDER — METFORMIN HYDROCHLORIDE 500 MG/1
500 TABLET ORAL 2 TIMES DAILY
Qty: 180 TABLET | Refills: 1 | Status: SHIPPED | OUTPATIENT
Start: 2024-03-21

## 2024-03-21 RX ORDER — ROSUVASTATIN CALCIUM 10 MG/1
10 TABLET, COATED ORAL NIGHTLY
Qty: 90 TABLET | Refills: 1 | Status: SHIPPED | OUTPATIENT
Start: 2024-03-21

## 2024-03-21 RX ORDER — CLOPIDOGREL BISULFATE 75 MG/1
75 TABLET ORAL DAILY
Qty: 90 TABLET | Refills: 1 | Status: SHIPPED | OUTPATIENT
Start: 2024-03-21

## 2024-03-21 RX ORDER — DAPAGLIFLOZIN 10 MG/1
10 TABLET, FILM COATED ORAL
Qty: 90 TABLET | Refills: 1 | Status: SHIPPED | OUTPATIENT
Start: 2024-03-21 | End: 2024-04-25 | Stop reason: SDUPTHER

## 2024-03-21 RX ORDER — LISINOPRIL 2.5 MG/1
2.5 TABLET ORAL DAILY
Qty: 90 TABLET | Refills: 1 | Status: SHIPPED | OUTPATIENT
Start: 2024-03-21

## 2024-03-21 ASSESSMENT — ENCOUNTER SYMPTOMS
NAUSEA: 0
VOMITING: 0
CHILLS: 0
BLOOD IN STOOL: 0
SHORTNESS OF BREATH: 0
COUGH: 0
FEVER: 0
CONFUSION: 0
DIZZINESS: 0
WHEEZING: 0
TROUBLE SWALLOWING: 0
UNEXPECTED WEIGHT CHANGE: 0
NUMBNESS: 0
ABDOMINAL PAIN: 0
DIARRHEA: 0
DIFFICULTY URINATING: 0
DYSURIA: 0
WEAKNESS: 0
LIGHT-HEADEDNESS: 0

## 2024-03-21 ASSESSMENT — ACTIVITIES OF DAILY LIVING (ADL)
DRESSING: INDEPENDENT
GROCERY_SHOPPING: INDEPENDENT
BATHING: INDEPENDENT
TAKING_MEDICATION: INDEPENDENT
MANAGING_FINANCES: INDEPENDENT
DOING_HOUSEWORK: INDEPENDENT

## 2024-03-21 ASSESSMENT — PATIENT HEALTH QUESTIONNAIRE - PHQ9
1. LITTLE INTEREST OR PLEASURE IN DOING THINGS: NOT AT ALL
2. FEELING DOWN, DEPRESSED OR HOPELESS: NOT AT ALL
SUM OF ALL RESPONSES TO PHQ9 QUESTIONS 1 AND 2: 0

## 2024-03-21 NOTE — PROGRESS NOTES
"Subjective   Reason for Visit: Erin Amin is an 64 y.o. female here for Medicare Annual Wellness Visit Subsequent (Pt is here for a Subsequent MWV. Fasting for labs. C/O bilateral ear fullness. )     Past Medical, Surgical, and Family History reviewed and updated in chart.         HPI    Generally feeling well.    Is not checking glucose regularly.  Is having problem(s) with medication(s). Feels she's not doing as well with Farxiga, as she was with Glimepiride.  Denies numbness, tingling, wound(s), dry skin, thick nails, and mycotic nails  Is not inspecting feet daily.   Is not seeing eye doctor at least annually.    Had her ears cleaned out recently, at urgent care. Chronic hearing loss in the left ear. Reports things are stable, not bothering her any more than usual.       Patient Care Team:  Taurus Jacobsen DO as PCP - General (Family Medicine)  John Moritz, MD as PCP - [employer] Advantage PCP  Chris Alford MD as Consulting Physician (Cardiology)     Review of Systems   Constitutional:  Negative for chills, fever and unexpected weight change.   HENT:  Positive for tinnitus. Negative for ear pain and trouble swallowing.    Respiratory:  Negative for cough, shortness of breath and wheezing.    Cardiovascular:  Negative for chest pain.   Gastrointestinal:  Negative for abdominal pain, blood in stool, diarrhea, nausea and vomiting.   Genitourinary:  Negative for difficulty urinating and dysuria.   Skin:  Negative for rash.   Neurological:  Negative for dizziness, syncope, weakness, light-headedness and numbness.   Psychiatric/Behavioral:  Negative for behavioral problems and confusion.        Objective   Vitals:  /77   Pulse 69   Temp 36.3 °C (97.4 °F)   Ht 1.372 m (4' 6\")   Wt 55.9 kg (123 lb 4 oz)   SpO2 97%   BMI 29.72 kg/m²       Physical Exam  Vitals and nursing note reviewed.   Constitutional:       General: She is not in acute distress.     Appearance: Normal appearance.   HENT:      " Head: Normocephalic and atraumatic.      Right Ear: Tympanic membrane, ear canal and external ear normal. There is no impacted cerumen.      Left Ear: Tympanic membrane, ear canal and external ear normal. There is no impacted cerumen.   Eyes:      General: No scleral icterus.     Extraocular Movements: Extraocular movements intact.      Conjunctiva/sclera: Conjunctivae normal.   Pulmonary:      Effort: Pulmonary effort is normal. No respiratory distress.   Feet:      Right foot:      Protective Sensation: 10 sites tested.  10 sites sensed.      Skin integrity: Callus and dry skin present.      Toenail Condition: Fungal disease present.     Left foot:      Protective Sensation: 10 sites tested.  10 sites sensed.      Skin integrity: Callus and dry skin present.      Toenail Condition: Fungal disease present.  Skin:     General: Skin is warm and dry.      Coloration: Skin is not jaundiced.   Neurological:      Mental Status: She is alert and oriented to person, place, and time. Mental status is at baseline.   Psychiatric:         Behavior: Behavior normal.         Assessment/Plan   Problem List Items Addressed This Visit       Acquired hypothyroidism    Relevant Medications    levothyroxine (Synthroid) 50 mcg tablet    Other Relevant Orders    TSH with reflex to Free T4 if abnormal (Completed)    Type 2 diabetes mellitus with hyperglycemia, without long-term current use of insulin (CMS/[facility])    Relevant Medications    dapagliflozin propanediol (Farxiga) 10 mg    metFORMIN (Glucophage) 500 mg tablet    Other Relevant Orders    POCT glycosylated hemoglobin (Hb A1C) manually resulted (Completed)    Follow Up In Primary Care    Referral to Podiatry    POCT Albumin random urine manually resulted (Completed)    Essential hypertension, benign    Relevant Medications    lisinopril 2.5 mg tablet    Hyperlipidemia    Relevant Medications    rosuvastatin (Crestor) 10 mg tablet    Other Relevant Orders    Creatine Kinase  (Completed)    Comprehensive Metabolic Panel (Completed)    Lipid Panel (Completed)    S/P CABG (coronary artery bypass graft)    Relevant Medications    clopidogrel (Plavix) 75 mg tablet    Medicare annual wellness visit, subsequent - Primary    Current Assessment & Plan     64yF doing fairly well.         Screening for cervical cancer    Current Assessment & Plan     Advised Pap test. Declines it here, prefers to see a gynecologist.          Other Visit Diagnoses       Asymptomatic menopausal state        Relevant Orders    XR DEXA bone density    Encounter for screening mammogram for breast cancer        Relevant Orders    BI mammo bilateral screening tomosynthesis    Need for hepatitis C screening test        Relevant Orders    Hepatitis C antibody (Completed)    Screening for colorectal cancer        Relevant Orders    Colonoscopy Screening; Average Risk Patient

## 2024-03-21 NOTE — PATIENT INSTRUCTIONS
Recommend seeing an eye doctor at least annually for a diabetic eye exam. Be sure to visually inspect your feet every day, looking for cuts, scrapes, infections. Propping a mirror against the wall at an angle can be used to help you see the bottoms of your feet, if necessary.  For non-insulin dependents diabetes, check your fasting (12 hours) glucose at least once a week.  Check your glucose 2 to 4 hours after meals, to help understand how different foods affect your glucose. Use this information to help guide food choices, to minimize spikes in glucose. If frequently getting numbers > 200, a medication change or improved medication adherence might be necessary. Seek immediate medical attention (ER, 911) for glucose < 55 or > 400, altered consciousness, seizure, significant dehydration, or other significant concern.  Advise follow-up appointments usually every 3 months.     Obstetrics and/or Gynecology   [place]  Dr. Madalyn Flood, Dr. Arianne Lam, Dr. Erin Lomas, Dr. Natalie Orellana, Dr. Wilina Quinn [phone]  Dr. Raffy Giordano [phone]  Dr. Karel Lazaro [phone]  Yari Sabillon [facility] [phone]  [place]  Dr. Chanell Tinajero, Dr. Corinne Bazella, Autumn Lester CNP, et al. [phone]     Podiatry  Dr. Angeles Chaudhary ([facility]) [phone]  Dr. Shari Munoz ([place], [place]) [phone]  Dr. Cabrera Benites ([place]) [phone]  Dr. Cathy Greene ([facility] [place]) [phone]  Dr. Chanell Stephens ([facility]) [phone]  Dr. Silvino Middleton ([facility]) [phone]  Dr. Arianne Wu ([place]) [phone]    Foot & Ankle Surgery  Dr. Johana Chen, Dr. Jose Manuel Woody, Dr. Hunter Thakur [phone]  Dr. Prabhu Young [phone]       Please return for a(n) diabetes, medication follow-up appointment in 3 months, earlier if any question or concern.    Recommended vaccines:  Influenza, annual  Respiratory  Syncytial Virus (RSV)  Shingrix (shingles) vaccine series  Avoid taking Biotin for a week prior to any blood tests, as it can interfere with certain results. Fasting for labs means 12 hours, nothing to eat or drink, except water and medications, unless directed otherwise.    For assistance with scheduling referrals or consultations, please call [phone]. For laboratory, radiology, and other tests, please call [phone] ([phone] for pediatrics). Please review prescription inserts and published information for possible adverse effects of all medications. Return after testing or consultation to review results and recommendations, if symptoms persist, change, worsen, or return, or if you have any question or concern. If you do not get results within 7-10 days, or you have any question or concern, please send a message, call the office ([phone]), or return to the office for a follow-up appointment. For non-emergencies, you may call the office. For emergencies, call 9-1-1 or go to the nearest Emergency Department. Please schedule additional appointment(s) to address concern(s) not addressed today.    In general, results are not released or discussed over the telephone, but at an appointment or via  Present. Results of tests done through [facility] are released via  Present (see below).  [email or web address]   Present support line: [phone]

## 2024-03-28 ENCOUNTER — TELEPHONE (OUTPATIENT)
Dept: PRIMARY CARE | Facility: CLINIC | Age: 64
End: 2024-03-28
Payer: MEDICARE

## 2024-03-28 NOTE — TELEPHONE ENCOUNTER
Pt calling she had lab work done last week and hasn't received the results. She was hoping they could be reviewed and she could get a call back.

## 2024-03-29 ENCOUNTER — OFFICE VISIT (OUTPATIENT)
Dept: CARDIOLOGY | Facility: CLINIC | Age: 64
End: 2024-03-29
Payer: MEDICARE

## 2024-03-29 VITALS
HEART RATE: 76 BPM | SYSTOLIC BLOOD PRESSURE: 144 MMHG | HEIGHT: 55 IN | OXYGEN SATURATION: 98 % | DIASTOLIC BLOOD PRESSURE: 74 MMHG | BODY MASS INDEX: 28.46 KG/M2 | WEIGHT: 123 LBS

## 2024-03-29 DIAGNOSIS — I10 ESSENTIAL HYPERTENSION, BENIGN: Primary | ICD-10-CM

## 2024-03-29 PROCEDURE — 3048F LDL-C <100 MG/DL: CPT | Performed by: INTERNAL MEDICINE

## 2024-03-29 PROCEDURE — 3075F SYST BP GE 130 - 139MM HG: CPT | Performed by: INTERNAL MEDICINE

## 2024-03-29 PROCEDURE — 99214 OFFICE O/P EST MOD 30 MIN: CPT | Performed by: INTERNAL MEDICINE

## 2024-03-29 PROCEDURE — 4010F ACE/ARB THERAPY RXD/TAKEN: CPT | Performed by: INTERNAL MEDICINE

## 2024-03-29 PROCEDURE — 3078F DIAST BP <80 MM HG: CPT | Performed by: INTERNAL MEDICINE

## 2024-03-29 RX ORDER — LOSARTAN POTASSIUM 50 MG/1
50 TABLET ORAL DAILY
Qty: 90 TABLET | Refills: 3 | Status: SHIPPED | OUTPATIENT
Start: 2024-03-29 | End: 2025-03-29

## 2024-03-29 ASSESSMENT — COLUMBIA-SUICIDE SEVERITY RATING SCALE - C-SSRS
2. HAVE YOU ACTUALLY HAD ANY THOUGHTS OF KILLING YOURSELF?: NO
1. IN THE PAST MONTH, HAVE YOU WISHED YOU WERE DEAD OR WISHED YOU COULD GO TO SLEEP AND NOT WAKE UP?: NO
6. HAVE YOU EVER DONE ANYTHING, STARTED TO DO ANYTHING, OR PREPARED TO DO ANYTHING TO END YOUR LIFE?: NO

## 2024-03-29 ASSESSMENT — PAIN SCALES - GENERAL: PAINLEVEL: 0-NO PAIN

## 2024-03-29 NOTE — PATIENT INSTRUCTIONS
Start Losartan 50 mg Take 1 tablet by  mouth once daily (Sent to [facility] )  Labs done 3/2024  Follow up in 6 months

## 2024-03-29 NOTE — PROGRESS NOTES
Follow-up.....Primary Care Physician: Taurus Jacobsen DO  Date of Visit: 03/29/2024  8:30 AM EDT  Location of [place] [facility] [place] [address]     Chief Complaint:   Chief Complaint   Patient presents with    Follow-up     HPI / Summary:   Erin Amin is a 64 y.o. 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  Surgical Hx:   She has a past surgical history that includes Coronary artery bypass graft (11/04/2013) and Appendectomy (11/04/2013).   Social Hx:   She reports that she has never smoked. She has never used smokeless tobacco. She reports that she does not currently use alcohol. She reports that she does not use drugs.  Family Hx:   Her family history includes Diabetes in an other family member; Heart disease in her father and other family members; Stroke in her father and another family member.   Allergies:  No Known Allergies  Outpatient Medications:  Current Outpatient Medications   Medication Instructions    aspirin 81 mg, oral, Daily RT    blood sugar diagnostic (OneTouch Ultra Test) strip 1 strip, miscellaneous, Daily    calcium carbonate-vitamin D3 (Calcium 600 with Vitamin D3) 600 mg-10 mcg (400 unit) chewable tablet 1 tablet, oral, Daily    clopidogrel (PLAVIX) 75 mg, oral, Daily    dapagliflozin propanediol (Farxiga) 10 mg TAKE 1 TABLET BY MOUTH EVERY MORNING    levothyroxine (SYNTHROID) 50 mcg, oral, Daily before breakfast    lisinopril 2.5 mg, oral, Daily    magnesium gluconate (Magonate) 27.5 mg magne- sium (500 mg) tablet 27.5 mg, oral, Daily with evening meal    metFORMIN (GLUCOPHAGE) 500 mg, oral, 2 times daily    OneTouch Delica Plus Lancet 33 gauge misc     rosuvastatin (CRESTOR) 10 mg, oral,  Nightly     Physical Exam:  There were no vitals filed for this visit.  Wt Readings from Last 5 Encounters:   03/21/24 55.9 kg (123 lb 4 oz)   09/18/23 56.7 kg (125 lb)   03/17/23 57.3 kg (126 lb 6.4 oz)   12/13/22 58.2 kg (128 lb 6 oz)   09/23/22 57.2 kg (126 lb)     Physical Exam  JVP not elevated. Carotid impulses are 2+ without overlying bruit.   Chest exhibits fair to good air movement with completely clear breath sounds.   The cardiac rhythm is regular with no premature beats.   Normal S1 and S2. No gallop, murmur or rub, or click.   Abdomen is soft and benign without focal tenderness.   With no lower leg edema. The pedal pulses are intact.     Last Labs:  Lab on 03/21/2024   Component Date Value    Creatine Kinase 03/21/2024 45     Glucose 03/21/2024 127 (H)     Sodium 03/21/2024 139     Potassium 03/21/2024 4.4     Chloride 03/21/2024 101     Bicarbonate 03/21/2024 25     Anion Gap 03/21/2024 17     Urea Nitrogen 03/21/2024 13     Creatinine 03/21/2024 0.78     eGFR 03/21/2024 85     Calcium 03/21/2024 10.6 (H)     Albumin 03/21/2024 5.3 (H)     Alkaline Phosphatase 03/21/2024 84     Total Protein 03/21/2024 7.9     AST 03/21/2024 20     Bilirubin, Total 03/21/2024 0.8     ALT 03/21/2024 34     Cholesterol 03/21/2024 126     HDL-Cholesterol 03/21/2024 43.6     Cholesterol/HDL Ratio 03/21/2024 2.9     LDL Calculated 03/21/2024 54     VLDL 03/21/2024 29     Triglycerides 03/21/2024 143     Non HDL Cholesterol 03/21/2024 82     Thyroid Stimulating Horm* 03/21/2024 2.21     Hepatitis C AB 03/21/2024 Nonreactive    Office Visit on 03/21/2024   Component Date Value    POC HEMOGLOBIN A1c 03/21/2024 6.9 (A)     POC Urine Albumin 03/21/2024 10     POC Urine Creatinine 03/21/2024 10     POC ALBUMIN /CREATININE * 03/21/2024 30 - 300 (A)         Assessment/Plan     1.  CAD, status post MI 9/11/2009.  2.  Status post CABG x 3 9/26/2009 with LIMA to LAD SVG to PDA SVG to lateral LCx branch.  3.  Status post MI #2 with 2  out of 3 graft occlusion.  4.  Status post MI x 3 with PCI and stent deployment 2010.  Patient is actually done relatively well over the past greater than 10 years.  She had a pharmacological nuclear stress test 10/14/2022 which showed a small sized area of moderate a fixed defect of the anterior wall to the apex consistent with prior infarction.  Echocardiogram 10/17/2022 estimated LV ejection fraction of 55 to 60% with abnormal septal motion due to prior thoracotomy.  Patient for uncertain reasons stop metoprolol claiming some adverse reaction with her eyes.  She will be started on losartan 50 mg daily.  The patient did have a pharmacological nuclear stress test 10/14/2022 which showed a small sized area of moderate fixed defect involving the anterior wall to the apex consistent with prior infarction.  She had a echocardiogram performed on 10/17/2022 that showed a preserved LV ejection fraction of 55-60% with abnormal septal motion due to prior CABG.  For uncertain reasons the patient discontinued her metoprolol claiming that it bothered her eyes.  Patient will be started back on losartan 50 mg daily.  Patient currently Plavix 75 mg daily rather than aspirin.  5.  Type 2 diabetes.  Lab work from 3/21/2024 includes glycohemoglobin of 6.9%.  Electrolyte panel was in normal range creatinine is 0.78 TSH 2.21 calcium 10.6.  CK was 45.  Continue metformin 500 mg twice daily Farxiga 10 mg daily unchanged.  6.  Hyperlipidemia.  Lipid panel from 3/21/2024 satisfactory with cholesterol 126 LDL 54 HDL 43.  Continue rosuvastatin 10 mg daily.  7.  Hypothyroidism  8.  History of melanoma of the left.        Orders:  No orders of the defined types were placed in this encounter.     Followup Appts:  Future Appointments   Date Time Provider Department Center   7/24/2024  9:30 AM Taurus Jacobsen, DO [id number]           ____________________________________________________________  [facility]  [facility]

## 2024-04-01 NOTE — TELEPHONE ENCOUNTER
----- Message from Taurus Jacobsen DO sent at 3/29/2024  5:43 PM EDT -----  Please make sure patient is aware of the comments or MyChart message.

## 2024-04-01 NOTE — TELEPHONE ENCOUNTER
Result Communication    Resulted Orders   POCT glycosylated hemoglobin (Hb A1C) manually resulted   Result Value Ref Range    POC HEMOGLOBIN A1c 6.9 (A) 4.2 - 6.5 %   POCT Albumin random urine manually resulted   Result Value Ref Range    POC Urine Albumin 10 No Reference Range Established mg/L    POC Urine Creatinine 10 No Reference Range Established mg/dl    POC ALBUMIN /CREATININE RATIO MANUALLY ENTERED 30 - 300 (A) <30 ug/mg Creat       12:41 PM      Results were successfully communicated with the patient and they acknowledged their understanding.

## 2024-04-18 ENCOUNTER — TELEPHONE (OUTPATIENT)
Dept: CARDIOLOGY | Facility: CLINIC | Age: 64
End: 2024-04-18
Payer: MEDICARE

## 2024-04-25 ENCOUNTER — TELEPHONE (OUTPATIENT)
Dept: PRIMARY CARE | Facility: CLINIC | Age: 64
End: 2024-04-25
Payer: MEDICARE

## 2024-04-25 DIAGNOSIS — E11.65 TYPE 2 DIABETES MELLITUS WITH HYPERGLYCEMIA, WITHOUT LONG-TERM CURRENT USE OF INSULIN (MULTI): ICD-10-CM

## 2024-04-25 NOTE — TELEPHONE ENCOUNTER
Pt called in stating she was approved for the Farxiga for free for the whole year though  [facility]. Last RX for this was sent on 03/21/2024 to [facility] incorrectly. Please advise Farxiga to [facility]. PPT DOWN TO 5 DAYS

## 2024-04-26 ENCOUNTER — TELEMEDICINE (OUTPATIENT)
Dept: PHARMACY | Facility: HOSPITAL | Age: 64
End: 2024-04-26
Payer: MEDICARE

## 2024-04-26 ENCOUNTER — PHARMACY VISIT (OUTPATIENT)
Dept: PHARMACY | Facility: CLINIC | Age: 64
End: 2024-04-26
Payer: COMMERCIAL

## 2024-04-26 DIAGNOSIS — E11.65 TYPE 2 DIABETES MELLITUS WITH HYPERGLYCEMIA, WITHOUT LONG-TERM CURRENT USE OF INSULIN (MULTI): ICD-10-CM

## 2024-04-26 DIAGNOSIS — E11.65 TYPE 2 DIABETES MELLITUS WITH HYPERGLYCEMIA, WITHOUT LONG-TERM CURRENT USE OF INSULIN (MULTI): Primary | ICD-10-CM

## 2024-04-26 PROCEDURE — RXMED WILLOW AMBULATORY MEDICATION CHARGE

## 2024-04-26 RX ORDER — DAPAGLIFLOZIN 10 MG/1
TABLET, FILM COATED ORAL
Qty: 90 TABLET | Refills: 3 | Status: SHIPPED | OUTPATIENT
Start: 2024-04-26 | End: 2024-04-29 | Stop reason: SDUPTHER

## 2024-04-26 NOTE — PROGRESS NOTES
Outpatient Clinical Pharmacy Visit  Erin Amin is a 64 y.o. female who was referred to the ambulatory Clinical Pharmacy Team for their re-enrollment in the  Patient Assistance Program.     Referring Provider: Taurus Jacobsen,     Subjective   No Known Allergies    Preferred Pharmacy    [id number] - [place], [place] - [address]  [address] [facility] [address]  Phone: [phone] Fax: [phone]    [facility]  [address], [address]  [facility] [address]  Phone: [phone] Fax: [phone]      Laboratory Results  Lab Results   Component Value Date    BILITOT 0.8 03/21/2024    CALCIUM 10.6 (H) 03/21/2024    CO2 25 03/21/2024     03/21/2024    CREATININE 0.78 03/21/2024    GLUCOSE 127 (H) 03/21/2024    ALKPHOS 84 03/21/2024    K 4.4 03/21/2024    PROT 7.9 03/21/2024     03/21/2024    AST 20 03/21/2024    ALT 34 03/21/2024    BUN 13 03/21/2024    ANIONGAP 17 03/21/2024    ALBUMIN 5.3 (H) 03/21/2024    GFRF >90 09/18/2023    EGFR 85 03/21/2024     Lab Results   Component Value Date    TRIG 143 03/21/2024    CHOL 126 03/21/2024    HDL 43.6 03/21/2024     Lab Results   Component Value Date    HGBA1C 6.9 (A) 03/21/2024       Assessment/Plan   Problem List Items Addressed This Visit       Type 2 diabetes mellitus with hyperglycemia, without long-term current use of insulin (Multi)     General Patient Discussion   PAP re-enrollment  Farxiga 10 mg daily    Plan/Changes   Continue all meds under the continuation of care with the referring provider and clinical pharmacy team  Start Farxiga 10 mg daily, send to [facility]    Next PCP Follow-Up  7/24/24    Next Clinical Pharmacy Follow-Up  Next Friday  DM assessment      Dorcas Handley PharmD     Verbal consent to manage patient's drug therapy was obtained from the patient. They were informed they may decline to participate or withdraw from participation in pharmacy services at any time.

## 2024-04-29 RX ORDER — DAPAGLIFLOZIN 10 MG/1
10 TABLET, FILM COATED ORAL
Qty: 90 TABLET | Refills: 1 | Status: SHIPPED | OUTPATIENT
Start: 2024-04-29

## 2024-05-02 PROCEDURE — RXMED WILLOW AMBULATORY MEDICATION CHARGE

## 2024-05-03 ENCOUNTER — TELEMEDICINE (OUTPATIENT)
Dept: PHARMACY | Facility: HOSPITAL | Age: 64
End: 2024-05-03
Payer: MEDICARE

## 2024-05-03 DIAGNOSIS — E11.65 TYPE 2 DIABETES MELLITUS WITH HYPERGLYCEMIA, WITHOUT LONG-TERM CURRENT USE OF INSULIN (MULTI): ICD-10-CM

## 2024-05-03 NOTE — PROGRESS NOTES
Outpatient Clinical Pharmacy Visit  Erin Amin is a 64 y.o. female who was referred to the ambulatory Clinical Pharmacy Team for their Medication Cost Assistance (Farxiga).    Referring Provider: Taurus Jacobsen, DO    Subjective   No Known Allergies    Preferred Pharmacy    [id number] - [place], [place] - [address] [facility]  [address] [facility] [address]  Phone: [phone] Fax: [phone]    [facility]  [address] [place] [address], [address]  [facility] [address]  Phone: [phone] Fax: [phone]    DIABETES ASSESSMENT     Current Diabetes Medication Regimen    Metformin  mg BID  Farxiga 10 mg daily    Secondary Prevention  Statin? Yes, describe: rosuvastatin 10 mg daily  ACE-I/ARB? Yes, describe: losartan 50 mg daily  Aspirin? No    Pertinent PMH Review:  PMH of Pancreatitis: Not discussed due to time  PMH of Retinopathy: No, however extensive vision issues due to previous medical history  PMH/FH of Medullary Thyroid Carcinoma or Multiple Endocrine Neoplasia Type 2: Not discussed due to time  PMH of Urinary Tract Infections: No    Health Maintenance:   Foot Exam: Not discussed due to time  Eye Exam: 2022  Lipid Panel: LDL 54 mg/dL and triglycerides 143 mg/dL as of 3/21/24    DIET: Not discussed due to time  EXERCISE: Not discussed due to time    Current monitoring regimen:   Patient is using: glucometer  Testing frequency: Not discussed due to time  Barriers to testing: Not discussed due to time    Patient-Reported Blood Glucose:      Has the patient experienced any low sugars since last contact? Not discussed due to time  Unknown symptoms of low blood glucose: sweaty, shaky, anxious, excessive hunger, confusion, lightheaded, nauseous, blurred vision  Has the patient experienced any high sugars since last contact? Not discussed due to time  Unknown symptoms of high blood glucose: excessive thirst, frequent urination, fatigue, nausea, shortness of breath, trouble  concentrating    Diabetes Patient Education    PATIENT GOALS   Fasting B - 130 mg/dL 2-HR Postprandial BG:   Less than 180 mg/dL A1c:   Less than 6.5 % or 7.0 %     Rule of 15: eating ~15 g of carbs when BG less than 80 (chocolate, half cup juice, 3-4 glucose tabs).  Recognize symptoms of high and low blood sugar.   Eat a realistic healthy diet consisting of fruits, vegetables, fiber, protein food choices on a regular basis and be aware of portion/serving sizes. Reduce carbohydrate consumption and always consume with protein and fat. Avoid foods high in saturated/trans fat, high salt content, and sweets and beverages with added sugars.  Limit alcohol consumption; alcohol may affect your blood sugar and cause hypoglycemia.   If not at a healthy weight, stay active and incorporate ~30 mins of exercise into your daily routine to manage your weight and increase the body's acceptance of insulin.      Laboratory Results  Lab Results   Component Value Date    BILITOT 0.8 2024    CALCIUM 10.6 (H) 2024    CO2 25 2024     2024    CREATININE 0.78 2024    GLUCOSE 127 (H) 2024    ALKPHOS 84 2024    K 4.4 2024    PROT 7.9 2024     2024    AST 20 2024    ALT 34 2024    BUN 13 2024    ANIONGAP 17 2024    ALBUMIN 5.3 (H) 2024    GFRF >90 2023    EGFR 85 2024     Lab Results   Component Value Date    TRIG 143 2024    CHOL 126 2024    HDL 43.6 2024     Lab Results   Component Value Date    HGBA1C 6.9 (A) 2024       Assessment/Plan   Problem List Items Addressed This Visit       Type 2 diabetes mellitus with hyperglycemia, without long-term current use of insulin (Multi)     General Patient Discussion  Patient has been approved for the  PAP for medication copays. Patient's approval will  24. Until this date, patient will receive Farxiga at $0 cost.   Unable to complete most  clinical queries due to time, will complete at next follow-up.   Patient voiced multiple concerns regarding prior medical history with her vision  This pharmacist encouraged patient to follow-up with her opthamologist    Plan/Changes   Continue all meds under the continuation of care with the referring provider and clinical pharmacy team    Next PCP Follow-Up  7/24/24    Next Clinical Pharmacy Follow-Up  Tuesday, May 7th 12pm  Complete DM assessment      Dorcas Handley PharmD     Verbal consent to manage patient's drug therapy was obtained from the patient. They were informed they may decline to participate or withdraw from participation in pharmacy services at any time.

## 2024-05-06 ENCOUNTER — PHARMACY VISIT (OUTPATIENT)
Dept: PHARMACY | Facility: CLINIC | Age: 64
End: 2024-05-06
Payer: MEDICARE

## 2024-05-07 ENCOUNTER — TELEMEDICINE (OUTPATIENT)
Dept: PHARMACY | Facility: HOSPITAL | Age: 64
End: 2024-05-07
Payer: MEDICARE

## 2024-05-07 DIAGNOSIS — E11.65 TYPE 2 DIABETES MELLITUS WITH HYPERGLYCEMIA, WITHOUT LONG-TERM CURRENT USE OF INSULIN (MULTI): ICD-10-CM

## 2024-05-07 RX ORDER — CALCIUM CARB/VITAMIN D3/VIT K1 500-500-40
2000 TABLET,CHEWABLE ORAL DAILY
COMMUNITY

## 2024-05-07 NOTE — PROGRESS NOTES
Outpatient Clinical Pharmacy Visit  Erin Amin is a 64 y.o. female who was referred to the ambulatory Clinical Pharmacy Team for their Medication Cost Assistance (Farxiga).    Referring Provider: Taurus Jacobsen, DO    Subjective   No Known Allergies    Preferred Pharmacy    [id number] - [place], [place] - [address] [facility]  [address] [facility] [address]  Phone: [phone] Fax: [phone]    [facility]  [address] [place] [address], [address]  [facility] [address]  Phone: [phone] Fax: [phone]    DIABETES ASSESSMENT      Current Diabetes Medication Regimen    Metformin  mg BID  Farxiga 10 mg daily     Secondary Prevention  Statin? Yes, describe: rosuvastatin 10 mg daily  ACE-I/ARB? Yes, describe: losartan 50 mg daily  Aspirin? No     Pertinent PMH Review:  PMH of Pancreatitis: Not discussed due to time  PMH of Retinopathy: No, however extensive vision issues due to previous medical history  PMH/FH of Medullary Thyroid Carcinoma or Multiple Endocrine Neoplasia Type 2: Not discussed due to time  PMH of Urinary Tract Infections: No     Health Maintenance:   Foot Exam: Not discussed due to time  Eye Exam: 2022- another eye exam scheduled 5/14/24  Lipid Panel: LDL 54 mg/dL and triglycerides 143 mg/dL as of 3/21/24     DIET: Not discussed due to time  EXERCISE: Not discussed due to time     Current monitoring regimen:   Patient is using: glucometer  Testing frequency: Cannot see to test BG  Barriers to testing: Cannot see to test BG     Patient-Reported Blood Glucose:   Unknown, see above     Has the patient experienced any low sugars since last contact? Not discussed due to time  Unknown symptoms of low blood glucose: sweaty, shaky, anxious, excessive hunger, confusion, lightheaded, nauseous, blurred vision  Has the patient experienced any high sugars since last contact? Not discussed due to time  Unknown symptoms of high blood glucose: excessive thirst, frequent  urination, fatigue, nausea, shortness of breath, trouble concentrating     Diabetes Patient Education          PATIENT GOALS   Fasting B - 130 mg/dL 2-HR Postprandial BG:   Less than 180 mg/dL A1c:   Less than 6.5 % or 7.0 %      Rule of 15: eating ~15 g of carbs when BG less than 80 (chocolate, half cup juice, 3-4 glucose tabs).  Recognize symptoms of high and low blood sugar.   Eat a realistic healthy diet consisting of fruits, vegetables, fiber, protein food choices on a regular basis and be aware of portion/serving sizes. Reduce carbohydrate consumption and always consume with protein and fat. Avoid foods high in saturated/trans fat, high salt content, and sweets and beverages with added sugars.  Limit alcohol consumption; alcohol may affect your blood sugar and cause hypoglycemia.   If not at a healthy weight, stay active and incorporate ~30 mins of exercise into your daily routine to manage your weight and increase the body's acceptance of insulin.    Laboratory Results  Lab Results   Component Value Date    BILITOT 0.8 2024    CALCIUM 10.6 (H) 2024    CO2 25 2024     2024    CREATININE 0.78 2024    GLUCOSE 127 (H) 2024    ALKPHOS 84 2024    K 4.4 2024    PROT 7.9 2024     2024    AST 20 2024    ALT 34 2024    BUN 13 2024    ANIONGAP 17 2024    ALBUMIN 5.3 (H) 2024    GFRF >90 2023    EGFR 85 2024     Lab Results   Component Value Date    TRIG 143 2024    CHOL 126 2024    HDL 43.6 2024     Lab Results   Component Value Date    HGBA1C 6.9 (A) 2024       Assessment/Plan   Problem List Items Addressed This Visit       Type 2 diabetes mellitus with hyperglycemia, without long-term current use of insulin (Multi)     General Patient Discussion  Patient continues to have significant concerns regarding vision due to prior health history- will be going to an ophthalmology  appointment on 5/14/24 for an independent consultation  Encouraged patient to contact this pharmacist with any other questions or concerns  Rescheduled  PAP follow-up appointment for next year.     Plan/Changes   Continue all meds under the continuation of care with the referring provider and clinical pharmacy team.    Next PCP Follow-Up  7/24/24    Next Clinical Pharmacy Follow-Up  ~1 year,  PAP renewal April 8, 2025, 12 pm      Dorcas Handley PharmD     Verbal consent to manage patient's drug therapy was obtained from the patient. They were informed they may decline to participate or withdraw from participation in pharmacy services at any time.

## 2024-06-14 ENCOUNTER — TELEPHONE (OUTPATIENT)
Dept: PRIMARY CARE | Facility: CLINIC | Age: 64
End: 2024-06-14
Payer: MEDICARE

## 2024-06-14 DIAGNOSIS — E78.2 MIXED HYPERLIPIDEMIA: ICD-10-CM

## 2024-06-14 NOTE — TELEPHONE ENCOUNTER
Rx Refill Request Telephone Encounter    Name:  Erin Amin  :  [id number]  Medication Name:  rosuvastatin (Crestor) 10 mg tablet  Take 1 tablet (10 mg) by mouth once daily at bedtime. - 90 day    Specific Pharmacy location:  [facility]  Date of last appointment:  2024  Date of next appointment:  24  Best number to reach patient:  [phone]

## 2024-06-18 RX ORDER — ROSUVASTATIN CALCIUM 10 MG/1
10 TABLET, COATED ORAL NIGHTLY
Qty: 90 TABLET | Refills: 1 | Status: SHIPPED | OUTPATIENT
Start: 2024-06-18

## 2024-07-24 ENCOUNTER — APPOINTMENT (OUTPATIENT)
Dept: PRIMARY CARE | Facility: CLINIC | Age: 64
End: 2024-07-24
Payer: MEDICARE

## 2024-07-29 DIAGNOSIS — E11.65 TYPE 2 DIABETES MELLITUS WITH HYPERGLYCEMIA, WITHOUT LONG-TERM CURRENT USE OF INSULIN (MULTI): ICD-10-CM

## 2024-07-29 PROCEDURE — RXMED WILLOW AMBULATORY MEDICATION CHARGE

## 2024-07-29 RX ORDER — DAPAGLIFLOZIN 10 MG/1
10 TABLET, FILM COATED ORAL
Qty: 90 TABLET | Refills: 1 | Status: SHIPPED | OUTPATIENT
Start: 2024-07-29

## 2024-08-13 ENCOUNTER — PHARMACY VISIT (OUTPATIENT)
Dept: PHARMACY | Facility: CLINIC | Age: 64
End: 2024-08-13
Payer: MEDICARE

## 2024-09-06 ENCOUNTER — OFFICE VISIT (OUTPATIENT)
Dept: CARDIOLOGY | Facility: CLINIC | Age: 64
End: 2024-09-06
Payer: MEDICARE

## 2024-09-06 VITALS
BODY MASS INDEX: 28.84 KG/M2 | OXYGEN SATURATION: 95 % | DIASTOLIC BLOOD PRESSURE: 70 MMHG | HEART RATE: 68 BPM | HEIGHT: 55 IN | SYSTOLIC BLOOD PRESSURE: 154 MMHG | WEIGHT: 124.6 LBS

## 2024-09-06 DIAGNOSIS — R09.89 CAROTID BRUIT, UNSPECIFIED LATERALITY: Primary | ICD-10-CM

## 2024-09-06 DIAGNOSIS — I10 ESSENTIAL HYPERTENSION, BENIGN: ICD-10-CM

## 2024-09-06 PROCEDURE — 3074F SYST BP LT 130 MM HG: CPT | Performed by: INTERNAL MEDICINE

## 2024-09-06 PROCEDURE — 99214 OFFICE O/P EST MOD 30 MIN: CPT | Performed by: INTERNAL MEDICINE

## 2024-09-06 PROCEDURE — 3048F LDL-C <100 MG/DL: CPT | Performed by: INTERNAL MEDICINE

## 2024-09-06 PROCEDURE — 3078F DIAST BP <80 MM HG: CPT | Performed by: INTERNAL MEDICINE

## 2024-09-06 PROCEDURE — 3008F BODY MASS INDEX DOCD: CPT | Performed by: INTERNAL MEDICINE

## 2024-09-06 PROCEDURE — 4010F ACE/ARB THERAPY RXD/TAKEN: CPT | Performed by: INTERNAL MEDICINE

## 2024-09-06 RX ORDER — LOSARTAN POTASSIUM 100 MG/1
100 TABLET ORAL DAILY
Qty: 90 TABLET | Refills: 3 | Status: SHIPPED | OUTPATIENT
Start: 2024-09-06 | End: 2025-09-06

## 2024-09-06 ASSESSMENT — PAIN SCALES - GENERAL: PAINLEVEL: 0-NO PAIN

## 2024-09-06 ASSESSMENT — COLUMBIA-SUICIDE SEVERITY RATING SCALE - C-SSRS
2. HAVE YOU ACTUALLY HAD ANY THOUGHTS OF KILLING YOURSELF?: NO
6. HAVE YOU EVER DONE ANYTHING, STARTED TO DO ANYTHING, OR PREPARED TO DO ANYTHING TO END YOUR LIFE?: NO
1. IN THE PAST MONTH, HAVE YOU WISHED YOU WERE DEAD OR WISHED YOU COULD GO TO SLEEP AND NOT WAKE UP?: NO

## 2024-09-06 ASSESSMENT — PATIENT HEALTH QUESTIONNAIRE - PHQ9
2. FEELING DOWN, DEPRESSED OR HOPELESS: NOT AT ALL
1. LITTLE INTEREST OR PLEASURE IN DOING THINGS: NOT AT ALL
SUM OF ALL RESPONSES TO PHQ9 QUESTIONS 1 AND 2: 0

## 2024-09-06 NOTE — PROGRESS NOTES
Follow-up.....Primary Care Physician: Cathy Puente, LIV-CNP  Date of Visit: 09/06/2024 10:30 AM EDT  [place] of visit[place] [facility] [place] [address]     Chief Complaint:   Chief Complaint   Patient presents with    Follow-up     HPI / Summary:   Erin Amin is a 64 y.o. 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  Surgical Hx:   She has a past surgical history that includes Coronary artery bypass graft (11/04/2013) and Appendectomy (11/04/2013).   Social Hx:   She reports that she has never smoked. She has never used smokeless tobacco. She reports that she does not currently use alcohol. She reports that she does not use drugs.  Family Hx:   Her family history includes Diabetes in an other family member; Heart disease in her father and other family members; Stroke in her father and another family member.   Allergies:  No Known Allergies  Outpatient Medications:  Current Outpatient Medications   Medication Instructions    blood sugar diagnostic (OneTouch Ultra Test) strip 1 strip, miscellaneous, Daily    calcium carbonate-vitamin D3 (Calcium 600 with Vitamin D3) 600 mg-10 mcg (400 unit) chewable tablet 1 tablet, oral, Daily    cholecalciferol (VITAMIN D3) 2,000 Units, oral, Daily    clopidogrel (PLAVIX) 75 mg, oral, Daily    Farxiga 10 mg, oral, Daily before breakfast    levothyroxine (SYNTHROID) 50 mcg, oral, Daily before breakfast    lisinopril 2.5 mg, oral, Daily    losartan (COZAAR) 50 mg, oral, Daily    magnesium gluconate (Magonate) 27.5 mg magne- sium (500 mg) tablet 27.5 mg, oral, Daily with evening meal    metFORMIN (GLUCOPHAGE) 500 mg, oral, 2 times daily    OneTouch Delica Plus Lancet 33 gauge mis      "rosuvastatin (CRESTOR) 10 mg, oral, Nightly     Physical Exam:  Vitals:    09/06/24 1040   BP: 128/74   BP Location: Left arm   Patient Position: Sitting   BP Cuff Size: Adult   Pulse: 64   SpO2: 95%   Weight: 56.5 kg (124 lb 9.6 oz)   Height: 1.372 m (4' 6\")     Wt Readings from Last 5 Encounters:   09/06/24 56.5 kg (124 lb 9.6 oz)   03/29/24 55.8 kg (123 lb)   03/21/24 55.9 kg (123 lb 4 oz)   09/18/23 56.7 kg (125 lb)   03/17/23 57.3 kg (126 lb 6.4 oz)     Physical Exam  JVP not elevated. Carotid impulses are 2+ without overlying bruit.   Chest exhibits fair to good air movement with completely clear breath sounds.   The cardiac rhythm is regular with no premature beats.   Normal S1 and S2. No gallop, murmur or rub, or click.   Abdomen is soft and benign without focal tenderness.   With no lower leg edema. The pedal pulses are intact.     Last Labs:  Lab on 03/21/2024   Component Date Value    Creatine Kinase 03/21/2024 45     Glucose 03/21/2024 127 (H)     Sodium 03/21/2024 139     Potassium 03/21/2024 4.4     Chloride 03/21/2024 101     Bicarbonate 03/21/2024 25     Anion Gap 03/21/2024 17     Urea Nitrogen 03/21/2024 13     Creatinine 03/21/2024 0.78     eGFR 03/21/2024 85     Calcium 03/21/2024 10.6 (H)     Albumin 03/21/2024 5.3 (H)     Alkaline Phosphatase 03/21/2024 84     Total Protein 03/21/2024 7.9     AST 03/21/2024 20     Bilirubin, Total 03/21/2024 0.8     ALT 03/21/2024 34     Cholesterol 03/21/2024 126     HDL-Cholesterol 03/21/2024 43.6     Cholesterol/HDL Ratio 03/21/2024 2.9     LDL Calculated 03/21/2024 54     VLDL 03/21/2024 29     Triglycerides 03/21/2024 143     Non HDL Cholesterol 03/21/2024 82     Thyroid Stimulating Horm* 03/21/2024 2.21     Hepatitis C AB 03/21/2024 Nonreactive    Office Visit on 03/21/2024   Component Date Value    POC HEMOGLOBIN A1c 03/21/2024 6.9 (A)     POC Urine Albumin 03/21/2024 10     POC Urine Creatinine 03/21/2024 10     POC ALBUMIN /CREATININE * 03/21/2024 30 - " 300 (A)         Assessment/Plan     1.  CAD, status post MI 9/11/2009.  2.  Status post CABG x 3 9/26/2009 with LIMA to LAD SVG to PDA SVG to lateral LCx branch.  3.  Status post MI #2 with 2 out of 3 graft occlusion.  4.  Status post MI x 3 with PCI and stent deployment 2010.  Patient is actually done relatively well over the past greater than 10 years.  She had a pharmacological nuclear stress test 10/14/2022 which showed a small sized area of moderate a fixed defect of the anterior wall to the apex consistent with prior infarction.  Echocardiogram 10/17/2022 estimated LV ejection fraction of 55 to 60% with abnormal septal motion due to prior thoracotomy.  Patient for uncertain reasons stop metoprolol claiming some adverse reaction with her eyes.  She will be started on losartan 50 mg daily.  The patient did have a pharmacological nuclear stress test 10/14/2022 which showed a small sized area of moderate fixed defect involving the anterior wall to the apex consistent with prior infarction.  She had a echocardiogram performed on 10/17/2022 that showed a preserved LV ejection fraction of 55-60% with abnormal septal motion due to prior CABG.  For uncertain reasons the patient discontinued her metoprolol claiming that it bothered her eyes.  Patient will be started back on losartan 50 mg daily.  Patient currently Plavix 75 mg daily rather than aspirin.  Patient presently asymptomatic without chest discomfort or shortness of breath.  5.  Type 2 diabetes.  Lab work from 3/21/2024 includes glycohemoglobin of 6.9%.  Electrolyte panel was in normal range creatinine is 0.78 TSH 2.21 calcium 10.6.  UACR was 300.  CK was 45.  Continue metformin 500 mg twice daily Farxiga 10 mg daily unchanged.  The patient had been on glimepiride previously which was discontinued with the addition of the Farxiga.  Her glycohemoglobin values have been slightly higher on the Farxiga.  6.  Hyperlipidemia.  Lipid panel from 3/21/2024 satisfactory  with cholesterol 126 LDL 54 HDL 43.  Continue rosuvastatin 10 mg daily.  7.  Hypothyroidism  8.  History of melanoma of the left.  9.  Hypertension.  Systolic blood pressure slightly elevated.  Will increase losartan from 50 to 100 mg daily.  10.?  Peripheral vascular disease.  Patient will be scheduled for a screening carotid ultrasound given her CAD.        Orders:  No orders of the defined types were placed in this encounter.     Followup Appts:  Future Appointments   Date Time Provider Department Center   10/3/2024  8:40 AM [facility] [id number]   4/8/2025 12:00 PM PHARMACY [id number]           ____________________________________________________________  Chris Alford MD  [facility]  [facility]

## 2024-09-12 DIAGNOSIS — E11.65 TYPE 2 DIABETES MELLITUS WITH HYPERGLYCEMIA, WITHOUT LONG-TERM CURRENT USE OF INSULIN (MULTI): ICD-10-CM

## 2024-09-12 RX ORDER — METFORMIN HYDROCHLORIDE 500 MG/1
500 TABLET ORAL 2 TIMES DAILY
Qty: 180 TABLET | Refills: 0 | Status: SHIPPED | OUTPATIENT
Start: 2024-09-12

## 2024-09-19 ENCOUNTER — TELEPHONE (OUTPATIENT)
Dept: CARDIOLOGY | Facility: CLINIC | Age: 64
End: 2024-09-19
Payer: MEDICARE

## 2024-09-23 DIAGNOSIS — I10 ESSENTIAL HYPERTENSION, BENIGN: Primary | ICD-10-CM

## 2024-09-23 RX ORDER — LOSARTAN POTASSIUM 50 MG/1
50 TABLET ORAL DAILY
Qty: 30 TABLET | Refills: 1 | Status: SHIPPED | OUTPATIENT
Start: 2024-09-23 | End: 2024-11-22

## 2024-10-01 ENCOUNTER — HOSPITAL ENCOUNTER (OUTPATIENT)
Dept: VASCULAR MEDICINE | Facility: CLINIC | Age: 64
Discharge: HOME | End: 2024-10-01
Payer: MEDICARE

## 2024-10-01 DIAGNOSIS — R09.89 CAROTID BRUIT, UNSPECIFIED LATERALITY: ICD-10-CM

## 2024-10-01 PROCEDURE — 93880 EXTRACRANIAL BILAT STUDY: CPT

## 2024-10-01 PROCEDURE — 93880 EXTRACRANIAL BILAT STUDY: CPT | Performed by: SURGERY

## 2024-10-03 ENCOUNTER — LAB (OUTPATIENT)
Dept: LAB | Facility: LAB | Age: 64
End: 2024-10-03
Payer: MEDICARE

## 2024-10-03 ENCOUNTER — APPOINTMENT (OUTPATIENT)
Dept: PRIMARY CARE | Facility: CLINIC | Age: 64
End: 2024-10-03
Payer: MEDICARE

## 2024-10-03 VITALS
WEIGHT: 123.2 LBS | BODY MASS INDEX: 28.51 KG/M2 | SYSTOLIC BLOOD PRESSURE: 134 MMHG | HEIGHT: 55 IN | OXYGEN SATURATION: 99 % | HEART RATE: 60 BPM | DIASTOLIC BLOOD PRESSURE: 72 MMHG

## 2024-10-03 DIAGNOSIS — G93.9 CHRONIC BRAIN DAMAGE: ICD-10-CM

## 2024-10-03 DIAGNOSIS — E03.9 ACQUIRED HYPOTHYROIDISM: ICD-10-CM

## 2024-10-03 DIAGNOSIS — E11.65 TYPE 2 DIABETES MELLITUS WITH HYPERGLYCEMIA, WITHOUT LONG-TERM CURRENT USE OF INSULIN: ICD-10-CM

## 2024-10-03 DIAGNOSIS — E78.2 MODERATE MIXED HYPERLIPIDEMIA NOT REQUIRING STATIN THERAPY: ICD-10-CM

## 2024-10-03 DIAGNOSIS — Z13.1 SCREENING FOR DIABETES MELLITUS: ICD-10-CM

## 2024-10-03 DIAGNOSIS — E55.9 VITAMIN D DEFICIENCY: ICD-10-CM

## 2024-10-03 DIAGNOSIS — Z00.00 ROUTINE ADULT HEALTH MAINTENANCE: Primary | ICD-10-CM

## 2024-10-03 DIAGNOSIS — I10 ESSENTIAL HYPERTENSION, BENIGN: ICD-10-CM

## 2024-10-03 DIAGNOSIS — I25.10 ATHEROSCLEROSIS OF NATIVE CORONARY ARTERY OF NATIVE HEART WITHOUT ANGINA PECTORIS: ICD-10-CM

## 2024-10-03 DIAGNOSIS — C69.92 MALIGNANT MELANOMA OF LEFT EYE (MULTI): ICD-10-CM

## 2024-10-03 LAB
25(OH)D3 SERPL-MCNC: 35 NG/ML (ref 30–100)
ALBUMIN SERPL BCP-MCNC: 4.9 G/DL (ref 3.4–5)
ALP SERPL-CCNC: 79 U/L (ref 33–136)
ALT SERPL W P-5'-P-CCNC: 40 U/L (ref 7–45)
ANION GAP SERPL CALC-SCNC: 15 MMOL/L (ref 10–20)
AST SERPL W P-5'-P-CCNC: 25 U/L (ref 9–39)
BASOPHILS # BLD AUTO: 0.07 X10*3/UL (ref 0–0.1)
BASOPHILS NFR BLD AUTO: 0.8 %
BILIRUB SERPL-MCNC: 0.6 MG/DL (ref 0–1.2)
BUN SERPL-MCNC: 17 MG/DL (ref 6–23)
CALCIUM SERPL-MCNC: 10.4 MG/DL (ref 8.6–10.3)
CHLORIDE SERPL-SCNC: 104 MMOL/L (ref 98–107)
CHOLEST SERPL-MCNC: 103 MG/DL (ref 0–199)
CHOLESTEROL/HDL RATIO: 2.6
CO2 SERPL-SCNC: 27 MMOL/L (ref 21–32)
CREAT SERPL-MCNC: 0.76 MG/DL (ref 0.5–1.05)
EGFRCR SERPLBLD CKD-EPI 2021: 88 ML/MIN/1.73M*2
EOSINOPHIL # BLD AUTO: 0.13 X10*3/UL (ref 0–0.7)
EOSINOPHIL NFR BLD AUTO: 1.5 %
ERYTHROCYTE [DISTWIDTH] IN BLOOD BY AUTOMATED COUNT: 12.6 % (ref 11.5–14.5)
GLUCOSE SERPL-MCNC: 110 MG/DL (ref 74–99)
HCT VFR BLD AUTO: 49.8 % (ref 36–46)
HDLC SERPL-MCNC: 39.9 MG/DL
HGB BLD-MCNC: 15.8 G/DL (ref 12–16)
IMM GRANULOCYTES # BLD AUTO: 0.03 X10*3/UL (ref 0–0.7)
IMM GRANULOCYTES NFR BLD AUTO: 0.4 % (ref 0–0.9)
LDLC SERPL CALC-MCNC: 42 MG/DL
LYMPHOCYTES # BLD AUTO: 2.49 X10*3/UL (ref 1.2–4.8)
LYMPHOCYTES NFR BLD AUTO: 29.4 %
MCH RBC QN AUTO: 30.3 PG (ref 26–34)
MCHC RBC AUTO-ENTMCNC: 31.7 G/DL (ref 32–36)
MCV RBC AUTO: 96 FL (ref 80–100)
MONOCYTES # BLD AUTO: 0.46 X10*3/UL (ref 0.1–1)
MONOCYTES NFR BLD AUTO: 5.4 %
NEUTROPHILS # BLD AUTO: 5.28 X10*3/UL (ref 1.2–7.7)
NEUTROPHILS NFR BLD AUTO: 62.5 %
NON HDL CHOLESTEROL: 63 MG/DL (ref 0–149)
NRBC BLD-RTO: 0 /100 WBCS (ref 0–0)
PLATELET # BLD AUTO: 265 X10*3/UL (ref 150–450)
POC ALBUMIN /CREATININE RATIO MANUALLY ENTERED: ABNORMAL UG/MG CREAT
POC APPEARANCE, URINE: CLEAR
POC BILIRUBIN, URINE: NEGATIVE
POC BLOOD, URINE: NEGATIVE
POC COLOR, URINE: ABNORMAL
POC GLUCOSE, URINE: ABNORMAL MG/DL
POC HEMOGLOBIN A1C: 7.4 % (ref 4.2–6.5)
POC KETONES, URINE: NEGATIVE MG/DL
POC LEUKOCYTES, URINE: NEGATIVE
POC NITRITE,URINE: NEGATIVE
POC PH, URINE: 5.5 PH
POC PROTEIN, URINE: NEGATIVE MG/DL
POC SPECIFIC GRAVITY, URINE: 1.01
POC URINE ALBUMIN: 10 MG/L
POC URINE CREATININE: 10 MG/DL
POC UROBILINOGEN, URINE: 0.2 EU/DL
POTASSIUM SERPL-SCNC: 5.1 MMOL/L (ref 3.5–5.3)
PROT SERPL-MCNC: 7.4 G/DL (ref 6.4–8.2)
RBC # BLD AUTO: 5.21 X10*6/UL (ref 4–5.2)
SODIUM SERPL-SCNC: 141 MMOL/L (ref 136–145)
TRIGL SERPL-MCNC: 105 MG/DL (ref 0–149)
TSH SERPL-ACNC: 2.95 MIU/L (ref 0.44–3.98)
VLDL: 21 MG/DL (ref 0–40)
WBC # BLD AUTO: 8.5 X10*3/UL (ref 4.4–11.3)

## 2024-10-03 PROCEDURE — 80053 COMPREHEN METABOLIC PANEL: CPT

## 2024-10-03 PROCEDURE — 80061 LIPID PANEL: CPT

## 2024-10-03 PROCEDURE — 82306 VITAMIN D 25 HYDROXY: CPT

## 2024-10-03 PROCEDURE — 36415 COLL VENOUS BLD VENIPUNCTURE: CPT

## 2024-10-03 PROCEDURE — 85025 COMPLETE CBC W/AUTO DIFF WBC: CPT

## 2024-10-03 PROCEDURE — 84443 ASSAY THYROID STIM HORMONE: CPT

## 2024-10-03 RX ORDER — GLIMEPIRIDE 1 MG/1
0.5 TABLET ORAL
Qty: 15 TABLET | Refills: 2 | Status: SHIPPED | OUTPATIENT
Start: 2024-10-03 | End: 2025-01-01

## 2024-10-03 ASSESSMENT — ENCOUNTER SYMPTOMS
ABDOMINAL DISTENTION: 0
TROUBLE SWALLOWING: 0
DIZZINESS: 0
SHORTNESS OF BREATH: 0
MYALGIAS: 0
CONSTIPATION: 0
HEADACHES: 0
BRUISES/BLEEDS EASILY: 0
SEIZURES: 0
WOUND: 0
DIARRHEA: 0
COLOR CHANGE: 0
JOINT SWELLING: 0
DIFFICULTY URINATING: 0
WEAKNESS: 1
BACK PAIN: 0
ADENOPATHY: 0
ABDOMINAL PAIN: 0
WHEEZING: 0
CHILLS: 0
NAUSEA: 0
FEVER: 0
PALPITATIONS: 0
EYE PAIN: 0
FATIGUE: 0
COUGH: 0

## 2024-10-03 NOTE — PROGRESS NOTES
Subjective   Patient ID: Erin Amin is a 64 y.o. female who presents for Follow-up (Med review).    Patient seen today in order to establish primary care.  Patient seen sitting up in office in no acute distress, with sister present for assessment.  Patient is alert, oriented and appears in fair spirits.  Patient is currently on disability due to multiple chronic comorbidities and lives with her sister.  She reports poor vision, chronic balance and vestibular issues secondary to brain damage from melanoma?  Patient reports that she needs to follow-up with an optometrist but appears to be nervous to undergo extensive ocular testing as they might find out that things are wrong.  Patient ambulates independently and although she admits to weakness, she does not want to ambulate with an assistive device.  No recently reported falls.  Patient does admit to some mild anxiety secondary to chronic illnesses.  She reports taking anxiety medications in the past but reports not tolerating them well.  Patient appears to have a good support system with her sister who is retired so they are at home together at all times.  No reported issues with appetite, staying hydrated, bowel or bladder.  She is a diabetic and feels like her current medication regiment has not been overly effective.  She reports that her diet has been stable but she can only perform minimal physical activities due to multiple comorbidities.  Patient denies any shortness of breath or chest pain on exertion.  Patient is requesting that glimepiride be reinitiated if A1c levels remain elevated.  Patient follows routinely with cardiology due to history of CAD/CABG.  She denies any issues with shortness of breath or chest pain upon exertion.  Patient does not smoke and appears to follow a relatively well-balanced diet.  She reports trying to stay active the best she can within the home doing chores around the house.  Medications reviewed.  Patient denies any other  acute concerns at this time.           Current Outpatient Medications on File Prior to Visit   Medication Sig Dispense Refill    blood sugar diagnostic (OneTouch Ultra Test) strip 1 strip once daily. 100 strip 2    calcium carbonate-vitamin D3 (Calcium 600 with Vitamin D3) 600 mg-10 mcg (400 unit) chewable tablet Chew 1 tablet once daily.      cholecalciferol (Vitamin D3) 400 unit capsule Take 5 capsules (50 mcg) by mouth once daily.      clopidogrel (Plavix) 75 mg tablet Take 1 tablet (75 mg) by mouth once daily. 90 tablet 1    dapagliflozin propanediol (Farxiga) 10 mg Take 1 tablet (10 mg) by mouth once daily in the morning. Take before meals. 90 tablet 1    levothyroxine (Synthroid) 50 mcg tablet Take 1 tablet (50 mcg) by mouth once daily in the morning. Take before meals. 90 tablet 1    losartan (Cozaar) 50 mg tablet Take 1 tablet (50 mg) by mouth once daily. 30 tablet 1    magnesium gluconate (Magonate) 27.5 mg magne- sium (500 mg) tablet Take 1 tablet (27.5 mg) by mouth once daily in the evening. Take with meals. 90 tablet 0    metFORMIN (Glucophage) 500 mg tablet TAKE ONE TABLET BY MOUTH TWO TIMES A  tablet 0    OneTouch Delica Plus Lancet 33 gauge misc       rosuvastatin (Crestor) 10 mg tablet Take 1 tablet (10 mg) by mouth once daily at bedtime. 90 tablet 1     No current facility-administered medications on file prior to visit.       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/2019    Chorioretinal atrophy of left eye    Unspecified visual field defects 09/26/2016    Visual field loss    Unspecified visual field defects 09/26/2016    Visual field loss        Past Surgical History:   Procedure Laterality Date    APPENDECTOMY  11/04/2013    Appendectomy     "CORONARY ARTERY BYPASS GRAFT  11/04/2013    CABG        Family History   Problem Relation Name Age of Onset    Stroke Father      Heart disease Father      Stroke Other grandparent     Heart disease Other grandparent     Heart disease Other      Diabetes Other          Review of Systems   Constitutional:  Negative for chills, fatigue and fever.   HENT:  Negative for dental problem and trouble swallowing.         Positive for chronic vestibular problems   Eyes:  Negative for pain and visual disturbance.        Positive for poor vision   Respiratory:  Negative for cough, shortness of breath and wheezing.    Cardiovascular:  Negative for chest pain, palpitations and leg swelling.   Gastrointestinal:  Negative for abdominal distention, abdominal pain, constipation, diarrhea and nausea.   Endocrine: Negative for cold intolerance and heat intolerance.   Genitourinary:  Negative for difficulty urinating.   Musculoskeletal:  Negative for back pain, gait problem, joint swelling and myalgias.   Skin:  Negative for color change, pallor, rash and wound.        Positive for melanoma history   Allergic/Immunologic: Negative for environmental allergies and food allergies.   Neurological:  Positive for weakness. Negative for dizziness, seizures and headaches.        Positive for balance issues   Hematological:  Negative for adenopathy. Does not bruise/bleed easily.   Psychiatric/Behavioral:  Negative for behavioral problems.         Patient admits to some mild anxiety   All other systems reviewed and are negative.      Objective   /72   Pulse 60   Ht 1.372 m (4' 6\")   Wt 55.9 kg (123 lb 3.2 oz)   SpO2 99%   BMI 29.70 kg/m²     Physical Exam  Constitutional:       General: She is not in acute distress.     Appearance: Normal appearance. She is not toxic-appearing.   HENT:      Head: Normocephalic and atraumatic.      Right Ear: Tympanic membrane, ear canal and external ear normal.      Left Ear: Tympanic membrane, ear " canal and external ear normal.      Nose: Nose normal.      Mouth/Throat:      Mouth: Mucous membranes are moist.      Pharynx: Oropharynx is clear.   Eyes:      Extraocular Movements: Extraocular movements intact.      Conjunctiva/sclera: Conjunctivae normal.      Pupils: Pupils are equal, round, and reactive to light.   Cardiovascular:      Rate and Rhythm: Normal rate and regular rhythm.      Pulses: Normal pulses.      Heart sounds: Normal heart sounds. No murmur heard.  Pulmonary:      Effort: Pulmonary effort is normal.      Breath sounds: Normal breath sounds. No wheezing.   Abdominal:      General: Bowel sounds are normal.      Palpations: Abdomen is soft.   Musculoskeletal:         General: No swelling. Normal range of motion.      Cervical back: Normal range of motion and neck supple.   Skin:     General: Skin is warm and dry.   Neurological:      General: No focal deficit present.      Mental Status: She is alert and oriented to person, place, and time. Mental status is at baseline.      Cranial Nerves: No cranial nerve deficit.      Motor: No weakness.   Psychiatric:         Mood and Affect: Mood normal.         Behavior: Behavior normal.         Thought Content: Thought content normal.         Judgment: Judgment normal.         Assessment/Plan   Problem List Items Addressed This Visit             ICD-10-CM    Acquired hypothyroidism E03.9     Lab Results   Component Value Date    TSH 2.95 10/03/2024     Patient to continue current levothyroxine dosing.  TSH levels ordered today for reassessment purposes         Relevant Orders    TSH with reflex to Free T4 if abnormal (Completed)    Atherosclerotic heart disease of native coronary artery without angina pectoris I25.10     Patient continues on Plavix and rosuvastatin without issue.  She is maintain routine follow-up with cardiology for continued evaluation and treatment recommendations.  Provider to be notified for any new cardiac concerns.          Chronic brain damage G93.9     Patient remains on disability and lives with her sister who helps her with tasks as needed.         Relevant Orders    Referral to Ophthalmology    Type 2 diabetes mellitus with hyperglycemia, without long-term current use of insulin E11.65     Lab Results   Component Value Date    HGBA1C 7.4 (A) 10/03/2024     Discussed elevated A1c with patient and she would like to reinitiate her glimepiride.  Will continue to monitor patient's blood glucose levels routinely and assess for any persistent issues with hypoglycemia or hyperglycemia         Relevant Medications    glimepiride (AmaryL) 1 mg tablet    Other Relevant Orders    POCT glycosylated hemoglobin (Hb A1C) manually resulted (Completed)    Comprehensive Metabolic Panel (Completed)    CBC and Auto Differential (Completed)    POCT Albumin random urine manually resulted (Completed)    POCT UA (nonautomated) manually resulted (Completed)    Hemoglobin A1C    Lipid Panel (Completed)    Essential hypertension, benign I10     Relatively stable on current medication regiment.  Will continue to monitor vital signs at subsequent visits.  Provider to be notified for any issues associated with hypotension or hypertension         Hyperlipidemia E78.5     Lipid panel ordered today for assessment purposes.  Patient continues on daily statin without issue         Malignant melanoma of left eye (Multi) C69.92     Discussed at length she is very concerned about her vision and history related to her eyes.  Encouraged with patient to follow-up with specialist for additional evaluation/treatment options         Relevant Orders    Referral to Ophthalmology    Routine adult health maintenance - Primary Z00.00     Routine blood work ordered today for assessment purposes.  -Colonoscopy orders in place for patient remains hesitant about undergoing assessment due to multiple comorbidities.  She reports polyp history in the past.  -Mammogram and DEXA screening  orders in place          Other Visit Diagnoses         Codes    Vitamin D deficiency     E55.9    Relevant Orders    Vitamin D 25-Hydroxy,Total (for eval of Vitamin D levels) (Completed)    Screening for diabetes mellitus     Z13.1    Relevant Orders    Hemoglobin A1C

## 2024-10-03 NOTE — PATIENT INSTRUCTIONS
Please arrange for routine follow-up in 3 months. It is recommended that you follow up with an ocular specialist for persistent vision/ balance concerns    Orders are in place for blood work to be completed; it is NOT fasting. You may have your blood work completed in this building through [facility] ([address], [place] [address]).  Hours:   Monday - Friday: 7:30 a.m. - 5 p.m. and Saturday: 8 a.m. - 12 p.m.  Phone:  [phone]

## 2024-10-04 ENCOUNTER — TELEPHONE (OUTPATIENT)
Dept: PRIMARY CARE | Facility: CLINIC | Age: 64
End: 2024-10-04
Payer: MEDICARE

## 2024-10-04 NOTE — TELEPHONE ENCOUNTER
----- Message from Cathymirian Puente sent at 10/3/2024  4:59 PM EDT -----  Can you please update patient regarding most recent blood work?  -Vitamin D, thyroid and cholesterol levels are all normal.  Blood counts are relatively stable.  Calcium levels are slightly elevated but kidney function and liver function remained stable.  We will continue to monitor your calcium levels at routine intervals.  -Please notify the office for any additional questions or concerns

## 2024-10-04 NOTE — TELEPHONE ENCOUNTER
Result Communication    Resulted Orders   Comprehensive Metabolic Panel   Result Value Ref Range    Glucose 110 (H) 74 - 99 mg/dL    Sodium 141 136 - 145 mmol/L    Potassium 5.1 3.5 - 5.3 mmol/L    Chloride 104 98 - 107 mmol/L    Bicarbonate 27 21 - 32 mmol/L    Anion Gap 15 10 - 20 mmol/L    Urea Nitrogen 17 6 - 23 mg/dL    Creatinine 0.76 0.50 - 1.05 mg/dL    eGFR 88 >60 mL/min/1.73m*2      Comment:      Calculations of estimated GFR are performed using the 2021 CKD-EPI Study Refit equation without the race variable for the IDMS-Traceable creatinine methods.  https://jasn.asnjournals.org/content/early/2021/09/22/ASN.9051555855    Calcium 10.4 (H) 8.6 - 10.3 mg/dL    Albumin 4.9 3.4 - 5.0 g/dL    Alkaline Phosphatase 79 33 - 136 U/L    Total Protein 7.4 6.4 - 8.2 g/dL    AST 25 9 - 39 U/L    Bilirubin, Total 0.6 0.0 - 1.2 mg/dL    ALT 40 7 - 45 U/L      Comment:      Patients treated with Sulfasalazine may generate falsely decreased results for ALT.   CBC and Auto Differential   Result Value Ref Range    WBC 8.5 4.4 - 11.3 x10*3/uL    nRBC 0.0 0.0 - 0.0 /100 WBCs    RBC 5.21 (H) 4.00 - 5.20 x10*6/uL    Hemoglobin 15.8 12.0 - 16.0 g/dL    Hematocrit 49.8 (H) 36.0 - 46.0 %    MCV 96 80 - 100 fL    MCH 30.3 26.0 - 34.0 pg    MCHC 31.7 (L) 32.0 - 36.0 g/dL    RDW 12.6 11.5 - 14.5 %    Platelets 265 150 - 450 x10*3/uL    Neutrophils % 62.5 40.0 - 80.0 %    Immature Granulocytes %, Automated 0.4 0.0 - 0.9 %      Comment:      Immature Granulocyte Count (IG) includes promyelocytes, myelocytes and metamyelocytes but does not include bands. Percent differential counts (%) should be interpreted in the context of the absolute cell counts (cells/UL).    Lymphocytes % 29.4 13.0 - 44.0 %    Monocytes % 5.4 2.0 - 10.0 %    Eosinophils % 1.5 0.0 - 6.0 %    Basophils % 0.8 0.0 - 2.0 %    Neutrophils Absolute 5.28 1.20 - 7.70 x10*3/uL      Comment:      Percent differential counts (%) should be interpreted in the context of the  absolute cell counts (cells/uL).    Immature Granulocytes Absolute, Automated 0.03 0.00 - 0.70 x10*3/uL    Lymphocytes Absolute 2.49 1.20 - 4.80 x10*3/uL    Monocytes Absolute 0.46 0.10 - 1.00 x10*3/uL    Eosinophils Absolute 0.13 0.00 - 0.70 x10*3/uL    Basophils Absolute 0.07 0.00 - 0.10 x10*3/uL   Vitamin D 25-Hydroxy,Total (for eval of Vitamin D levels)   Result Value Ref Range    Vitamin D, 25-Hydroxy, Total 35 30 - 100 ng/mL    Narrative    Deficiency:         < 20   ng/ml  Insufficiency:      20-29  ng/ml  Sufficiency:         ng/ml  This assay accurately quantifies the sum of Vitamin D3, 25-Hydroxy and Vitamin D2,25-Hydroxy.   TSH with reflex to Free T4 if abnormal   Result Value Ref Range    Thyroid Stimulating Hormone 2.95 0.44 - 3.98 mIU/L    Narrative    TSH testing is performed using different testing methodology at [facility] than at other [facility]. Direct result comparisons should only be made within the same method.     Lipid Panel   Result Value Ref Range    Cholesterol 103 0 - 199 mg/dL      Comment:            Age      Desirable   Borderline High   High     0-19 Y     0 - 169       170 - 199     >/= 200    20-24 Y     0 - 189       190 - 224     >/= 225         >24 Y     0 - 199       200 - 239     >/= 240   **All ranges are based on fasting samples. Specific   therapeutic targets will vary based on patient-specific   cardiac risk.    Pediatric guidelines reference:Pediatrics 2011, 128(S5).Adult guidelines reference: NCEP ATPIII Guidelines,ARBEN 2001, 258:2486-97    Venipuncture immediately after or during the administration of Metamizole may lead to falsely low results. Testing should be performed immediately prior to Metamizole dosing.    HDL-Cholesterol 39.9 mg/dL      Comment:        Age       Very Low   Low     Normal    High    0-19 Y    < 35      < 40     40-45     ----  20-24 Y    ----     < 40      >45      ----        >24 Y      ----     < 40     40-60       >60      Cholesterol/HDL Ratio 2.6       Comment:        Ref Values  Desirable  < 3.4  High Risk  > 5.0    LDL Calculated 42 <=99 mg/dL      Comment:                                  Near   Borderline      AGE      Desirable  Optimal    High     High     Very High     0-19 Y     0 - 109     ---    110-129   >/= 130     ----    20-24 Y     0 - 119     ---    120-159   >/= 160     ----      >24 Y     0 -  99   100-129  130-159   160-189     >/=190      VLDL 21 0 - 40 mg/dL    Triglycerides 105 0 - 149 mg/dL      Comment:         Age         Desirable   Borderline High   High     Very High   0 D-90 D    19 - 174         ----         ----        ----  91 D- 9 Y     0 -  74        75 -  99     >/= 100      ----    10-19 Y     0 -  89        90 - 129     >/= 130      ----    20-24 Y     0 - 114       115 - 149     >/= 150      ----         >24 Y     0 - 149       150 - 199    200- 499    >/= 500    Venipuncture immediately after or during the administration of Metamizole may lead to falsely low results. Testing should be performed immediately prior to Metamizole dosing.    Non HDL Cholesterol 63 0 - 149 mg/dL      Comment:            Age       Desirable   Borderline High   High     Very High     0-19 Y     0 - 119       120 - 144     >/= 145    >/= 160    20-24 Y     0 - 149       150 - 189     >/= 190      ----         >24 Y    30 mg/dL above LDL Cholesterol goal         11:12 AM      Results were successfully communicated with the patient and they acknowledged their understanding.

## 2024-10-05 PROBLEM — Z00.00 ROUTINE ADULT HEALTH MAINTENANCE: Status: ACTIVE | Noted: 2024-10-05

## 2024-10-05 NOTE — ASSESSMENT & PLAN NOTE
Routine blood work ordered today for assessment purposes.  -Colonoscopy orders in place for patient remains hesitant about undergoing assessment due to multiple comorbidities.  She reports polyp history in the past.  -Mammogram and DEXA screening orders in place

## 2024-10-05 NOTE — ASSESSMENT & PLAN NOTE
Lab Results   Component Value Date    TSH 2.95 10/03/2024     Patient to continue current levothyroxine dosing.  TSH levels ordered today for reassessment purposes

## 2024-10-05 NOTE — ASSESSMENT & PLAN NOTE
Patient continues on Plavix and rosuvastatin without issue.  She is maintain routine follow-up with cardiology for continued evaluation and treatment recommendations.  Provider to be notified for any new cardiac concerns.

## 2024-10-05 NOTE — ASSESSMENT & PLAN NOTE
Relatively stable on current medication regiment.  Will continue to monitor vital signs at subsequent visits.  Provider to be notified for any issues associated with hypotension or hypertension

## 2024-10-05 NOTE — ASSESSMENT & PLAN NOTE
Discussed at length she is very concerned about her vision and history related to her eyes.  Encouraged with patient to follow-up with specialist for additional evaluation/treatment options

## 2024-10-05 NOTE — ASSESSMENT & PLAN NOTE
Lab Results   Component Value Date    HGBA1C 7.4 (A) 10/03/2024     Discussed elevated A1c with patient and she would like to reinitiate her glimepiride.  Will continue to monitor patient's blood glucose levels routinely and assess for any persistent issues with hypoglycemia or hyperglycemia

## 2024-10-05 NOTE — ASSESSMENT & PLAN NOTE
Lipid panel ordered today for assessment purposes.  Patient continues on daily statin without issue

## 2024-10-17 ENCOUNTER — TELEPHONE (OUTPATIENT)
Dept: PRIMARY CARE | Facility: CLINIC | Age: 64
End: 2024-10-17
Payer: MEDICARE

## 2024-10-17 ENCOUNTER — TELEPHONE (OUTPATIENT)
Dept: CARDIOLOGY | Facility: CLINIC | Age: 64
End: 2024-10-17
Payer: MEDICARE

## 2024-10-17 NOTE — TELEPHONE ENCOUNTER
I apologize for any miscommunication, but can you please let the patient know that her in office urine dip from the third was negative for infection concerns.  It only showed elevated blood glucose levels.  As far as diabetic medication goes, she can stop the glimepiride and increase her morning metformin to 2 tablets for 2 weeks in addition to taking her evening dose and then increase her metformin to 2 tablets twice daily moving forward.

## 2024-10-17 NOTE — TELEPHONE ENCOUNTER
Pt calling states she hasn't received results for her urine test. Also she says she isn't sure if she is using the Glimiperide the right way or it isn't agreeing with her. She would like a call back.    Thank you

## 2024-10-17 NOTE — TELEPHONE ENCOUNTER
Pt states that she has been having some light headiness with taking glimepiride pt is wondering if she should increase her metformin or have some other changes

## 2024-10-22 ENCOUNTER — TELEPHONE (OUTPATIENT)
Dept: PRIMARY CARE | Facility: CLINIC | Age: 64
End: 2024-10-22
Payer: MEDICARE

## 2024-10-22 DIAGNOSIS — Z79.4 TYPE 2 DIABETES MELLITUS WITHOUT COMPLICATION, WITH LONG-TERM CURRENT USE OF INSULIN (MULTI): ICD-10-CM

## 2024-10-22 DIAGNOSIS — E11.9 TYPE 2 DIABETES MELLITUS WITHOUT COMPLICATION, WITH LONG-TERM CURRENT USE OF INSULIN (MULTI): ICD-10-CM

## 2024-10-22 RX ORDER — LANCETS
EACH MISCELLANEOUS
Qty: 100 EACH | Refills: 3 | Status: SHIPPED | OUTPATIENT
Start: 2024-10-22

## 2024-10-22 RX ORDER — DEXTROSE 4 G
TABLET,CHEWABLE ORAL
Qty: 1 EACH | Refills: 0 | Status: SHIPPED | OUTPATIENT
Start: 2024-10-22

## 2024-10-25 DIAGNOSIS — E03.9 ACQUIRED HYPOTHYROIDISM: ICD-10-CM

## 2024-10-25 RX ORDER — LEVOTHYROXINE SODIUM 50 UG/1
50 TABLET ORAL
Qty: 90 TABLET | Refills: 0 | Status: SHIPPED | OUTPATIENT
Start: 2024-10-25

## 2024-11-04 PROCEDURE — RXMED WILLOW AMBULATORY MEDICATION CHARGE

## 2024-11-07 ENCOUNTER — PHARMACY VISIT (OUTPATIENT)
Dept: PHARMACY | Facility: CLINIC | Age: 64
End: 2024-11-07
Payer: MEDICARE

## 2024-11-22 ENCOUNTER — TELEPHONE (OUTPATIENT)
Dept: PRIMARY CARE | Facility: CLINIC | Age: 64
End: 2024-11-22
Payer: MEDICARE

## 2024-11-22 DIAGNOSIS — I10 ESSENTIAL HYPERTENSION, BENIGN: ICD-10-CM

## 2024-11-22 RX ORDER — LOSARTAN POTASSIUM 50 MG/1
50 TABLET ORAL DAILY
Qty: 90 TABLET | Refills: 1 | Status: SHIPPED | OUTPATIENT
Start: 2024-11-22 | End: 2025-05-21

## 2024-11-22 NOTE — TELEPHONE ENCOUNTER
Medication Refill Request:       Medication: losartan (Cozaar) 50 mg tablet   1 po every day     Qty: 90       LOV: 10/03/2024    NOV: 1/17/2025      Pharmacy: [facility] [place]

## 2024-12-02 ENCOUNTER — TELEPHONE (OUTPATIENT)
Dept: PRIMARY CARE | Facility: CLINIC | Age: 64
End: 2024-12-02
Payer: MEDICARE

## 2024-12-02 DIAGNOSIS — E11.65 TYPE 2 DIABETES MELLITUS WITH HYPERGLYCEMIA, WITHOUT LONG-TERM CURRENT USE OF INSULIN: ICD-10-CM

## 2024-12-02 RX ORDER — METFORMIN HYDROCHLORIDE 500 MG/1
500 TABLET ORAL 2 TIMES DAILY
Qty: 180 TABLET | Refills: 0 | Status: SHIPPED | OUTPATIENT
Start: 2024-12-02 | End: 2024-12-06 | Stop reason: SDUPTHER

## 2024-12-02 NOTE — TELEPHONE ENCOUNTER
Rx Refill Request Telephone Encounter    Name:  Erin Amin  :  [id number]  Medication Name:    METFORMIN 500MG TID   Specific Pharmacy location:  [facility]    Date of last appointment:  10/03/2024  Date of next appointment:  2025  Best number to reach patient:  [phone]

## 2024-12-06 ENCOUNTER — TELEPHONE (OUTPATIENT)
Dept: PRIMARY CARE | Facility: CLINIC | Age: 64
End: 2024-12-06
Payer: MEDICARE

## 2024-12-06 DIAGNOSIS — E11.65 TYPE 2 DIABETES MELLITUS WITH HYPERGLYCEMIA, WITHOUT LONG-TERM CURRENT USE OF INSULIN: ICD-10-CM

## 2024-12-06 RX ORDER — METFORMIN HYDROCHLORIDE 500 MG/1
1000 TABLET ORAL 2 TIMES DAILY
Qty: 360 TABLET | Refills: 1 | Status: SHIPPED | OUTPATIENT
Start: 2024-12-06

## 2024-12-06 NOTE — TELEPHONE ENCOUNTER
Patient received her refill for Metformin and noted the directions state to take 2 po every day. She states she was previously instructed to take 3 po every day, so she would like some clarification on how many she should be taking daily.

## 2024-12-10 DIAGNOSIS — Z95.1 S/P CABG (CORONARY ARTERY BYPASS GRAFT): ICD-10-CM

## 2024-12-10 RX ORDER — CLOPIDOGREL BISULFATE 75 MG/1
75 TABLET ORAL DAILY
Qty: 90 TABLET | Refills: 0 | Status: SHIPPED | OUTPATIENT
Start: 2024-12-10

## 2025-01-14 ENCOUNTER — TELEPHONE (OUTPATIENT)
Dept: PRIMARY CARE | Facility: CLINIC | Age: 65
End: 2025-01-14
Payer: MEDICARE

## 2025-01-14 DIAGNOSIS — E11.65 TYPE 2 DIABETES MELLITUS WITH HYPERGLYCEMIA, WITHOUT LONG-TERM CURRENT USE OF INSULIN: ICD-10-CM

## 2025-01-14 PROBLEM — H31.109 CHORIORETINAL ATROPHY: Status: ACTIVE | Noted: 2019-02-05

## 2025-01-14 PROBLEM — D49.6 INTRACRANIAL TUMOR (MULTI): Status: ACTIVE | Noted: 2025-01-14

## 2025-01-14 PROBLEM — H93.3X9 VESTIBULAR NERVE DISORDER: Status: ACTIVE | Noted: 2025-01-14

## 2025-01-14 PROBLEM — N76.4 FURUNCLE OF VULVA: Status: ACTIVE | Noted: 2025-01-14

## 2025-01-14 PROBLEM — H49.889: Status: ACTIVE | Noted: 2025-01-14

## 2025-01-14 PROBLEM — H51.9 ABNORMAL EYE MOVEMENTS: Status: ACTIVE | Noted: 2025-01-14

## 2025-01-14 RX ORDER — METFORMIN HYDROCHLORIDE 500 MG/1
1000 TABLET ORAL 2 TIMES DAILY
Qty: 360 TABLET | Refills: 0 | Status: SHIPPED | OUTPATIENT
Start: 2025-01-14

## 2025-01-14 NOTE — TELEPHONE ENCOUNTER
Medication Refill Request:       Medication: metformin (Glucophage) 500 mg tablet  2 po 2 x per day      Qty: 360       LOV: 10/03/2024    NOV:1/17/2025      [facility]: [facility]

## 2025-01-17 ENCOUNTER — LAB (OUTPATIENT)
Dept: LAB | Facility: LAB | Age: 65
End: 2025-01-17
Payer: MEDICARE

## 2025-01-17 ENCOUNTER — APPOINTMENT (OUTPATIENT)
Dept: PRIMARY CARE | Facility: CLINIC | Age: 65
End: 2025-01-17
Payer: MEDICARE

## 2025-01-17 VITALS
BODY MASS INDEX: 27.86 KG/M2 | DIASTOLIC BLOOD PRESSURE: 78 MMHG | SYSTOLIC BLOOD PRESSURE: 130 MMHG | OXYGEN SATURATION: 96 % | WEIGHT: 120.4 LBS | HEART RATE: 73 BPM | HEIGHT: 55 IN

## 2025-01-17 DIAGNOSIS — Z12.11 ENCOUNTER FOR COLORECTAL CANCER SCREENING: ICD-10-CM

## 2025-01-17 DIAGNOSIS — Z12.31 ENCOUNTER FOR SCREENING MAMMOGRAM FOR MALIGNANT NEOPLASM OF BREAST: ICD-10-CM

## 2025-01-17 DIAGNOSIS — Z78.0 MENOPAUSE: ICD-10-CM

## 2025-01-17 DIAGNOSIS — G93.9 CHRONIC BRAIN DAMAGE: ICD-10-CM

## 2025-01-17 DIAGNOSIS — Z12.12 ENCOUNTER FOR COLORECTAL CANCER SCREENING: ICD-10-CM

## 2025-01-17 DIAGNOSIS — E11.65 TYPE 2 DIABETES MELLITUS WITH HYPERGLYCEMIA, WITHOUT LONG-TERM CURRENT USE OF INSULIN: ICD-10-CM

## 2025-01-17 DIAGNOSIS — E78.2 MODERATE MIXED HYPERLIPIDEMIA NOT REQUIRING STATIN THERAPY: ICD-10-CM

## 2025-01-17 DIAGNOSIS — Z13.1 SCREENING FOR DIABETES MELLITUS: ICD-10-CM

## 2025-01-17 DIAGNOSIS — E83.52 HYPERCALCEMIA: ICD-10-CM

## 2025-01-17 DIAGNOSIS — Z00.00 ROUTINE ADULT HEALTH MAINTENANCE: ICD-10-CM

## 2025-01-17 DIAGNOSIS — Z00.00 ROUTINE GENERAL MEDICAL EXAMINATION AT HEALTH CARE FACILITY: Primary | ICD-10-CM

## 2025-01-17 DIAGNOSIS — F41.9 ANXIETY: ICD-10-CM

## 2025-01-17 DIAGNOSIS — C69.92 MALIGNANT MELANOMA OF LEFT EYE (MULTI): ICD-10-CM

## 2025-01-17 DIAGNOSIS — E03.9 ACQUIRED HYPOTHYROIDISM: ICD-10-CM

## 2025-01-17 DIAGNOSIS — Z13.820 SCREENING FOR OSTEOPOROSIS: ICD-10-CM

## 2025-01-17 DIAGNOSIS — I10 ESSENTIAL HYPERTENSION, BENIGN: ICD-10-CM

## 2025-01-17 DIAGNOSIS — I25.10 ATHEROSCLEROSIS OF NATIVE CORONARY ARTERY OF NATIVE HEART WITHOUT ANGINA PECTORIS: ICD-10-CM

## 2025-01-17 PROBLEM — R42 LIGHTHEADEDNESS: Status: RESOLVED | Noted: 2023-02-03 | Resolved: 2025-01-17

## 2025-01-17 PROBLEM — Z85.840 HISTORY OF MALIGNANT MELANOMA OF EYE: Status: RESOLVED | Noted: 2023-02-03 | Resolved: 2025-01-17

## 2025-01-17 PROBLEM — E87.5 HYPERKALEMIA: Status: RESOLVED | Noted: 2023-02-03 | Resolved: 2025-01-17

## 2025-01-17 PROBLEM — R74.01 ELEVATED TRANSAMINASE LEVEL: Status: RESOLVED | Noted: 2023-02-03 | Resolved: 2025-01-17

## 2025-01-17 PROBLEM — R74.8 ABNORMAL LIVER ENZYMES: Status: RESOLVED | Noted: 2023-02-03 | Resolved: 2025-01-17

## 2025-01-17 PROBLEM — R53.83 OTHER FATIGUE: Status: RESOLVED | Noted: 2023-02-03 | Resolved: 2025-01-17

## 2025-01-17 PROBLEM — L02.215 PERINEAL ABSCESS: Status: RESOLVED | Noted: 2023-02-03 | Resolved: 2025-01-17

## 2025-01-17 PROBLEM — B35.1 ONYCHOMYCOSIS OF TOENAIL: Status: RESOLVED | Noted: 2023-02-03 | Resolved: 2025-01-17

## 2025-01-17 PROBLEM — E78.00 HYPERCHOLESTEREMIA: Status: RESOLVED | Noted: 2023-02-03 | Resolved: 2025-01-17

## 2025-01-17 LAB
ALBUMIN SERPL BCP-MCNC: 5 G/DL (ref 3.4–5)
ALP SERPL-CCNC: 68 U/L (ref 33–136)
ALT SERPL W P-5'-P-CCNC: 24 U/L (ref 7–45)
ANION GAP SERPL CALC-SCNC: 17 MMOL/L (ref 10–20)
AST SERPL W P-5'-P-CCNC: 20 U/L (ref 9–39)
BASOPHILS # BLD AUTO: 0.08 X10*3/UL (ref 0–0.1)
BASOPHILS NFR BLD AUTO: 0.9 %
BILIRUB SERPL-MCNC: 0.6 MG/DL (ref 0–1.2)
BUN SERPL-MCNC: 18 MG/DL (ref 6–23)
CALCIUM SERPL-MCNC: 10.1 MG/DL (ref 8.6–10.3)
CHLORIDE SERPL-SCNC: 102 MMOL/L (ref 98–107)
CO2 SERPL-SCNC: 25 MMOL/L (ref 21–32)
CREAT SERPL-MCNC: 0.81 MG/DL (ref 0.5–1.05)
EGFRCR SERPLBLD CKD-EPI 2021: 81 ML/MIN/1.73M*2
EOSINOPHIL # BLD AUTO: 0.11 X10*3/UL (ref 0–0.7)
EOSINOPHIL NFR BLD AUTO: 1.2 %
ERYTHROCYTE [DISTWIDTH] IN BLOOD BY AUTOMATED COUNT: 12.6 % (ref 11.5–14.5)
EST. AVERAGE GLUCOSE BLD GHB EST-MCNC: 140 MG/DL
GLUCOSE SERPL-MCNC: 112 MG/DL (ref 74–99)
HBA1C MFR BLD: 6.5 %
HCT VFR BLD AUTO: 48.5 % (ref 36–46)
HGB BLD-MCNC: 15.3 G/DL (ref 12–16)
IMM GRANULOCYTES # BLD AUTO: 0.02 X10*3/UL (ref 0–0.7)
IMM GRANULOCYTES NFR BLD AUTO: 0.2 % (ref 0–0.9)
LYMPHOCYTES # BLD AUTO: 2.3 X10*3/UL (ref 1.2–4.8)
LYMPHOCYTES NFR BLD AUTO: 25.1 %
MCH RBC QN AUTO: 29.7 PG (ref 26–34)
MCHC RBC AUTO-ENTMCNC: 31.5 G/DL (ref 32–36)
MCV RBC AUTO: 94 FL (ref 80–100)
MONOCYTES # BLD AUTO: 0.62 X10*3/UL (ref 0.1–1)
MONOCYTES NFR BLD AUTO: 6.8 %
NEUTROPHILS # BLD AUTO: 6.05 X10*3/UL (ref 1.2–7.7)
NEUTROPHILS NFR BLD AUTO: 65.8 %
NRBC BLD-RTO: 0 /100 WBCS (ref 0–0)
PLATELET # BLD AUTO: 307 X10*3/UL (ref 150–450)
POC ALBUMIN /CREATININE RATIO MANUALLY ENTERED: ABNORMAL UG/MG CREAT
POC HEMOGLOBIN A1C: 6.6 % (ref 4.2–6.5)
POC URINE ALBUMIN: 10 MG/L
POC URINE CREATININE: 10 MG/DL
POTASSIUM SERPL-SCNC: 5 MMOL/L (ref 3.5–5.3)
PROT SERPL-MCNC: 7 G/DL (ref 6.4–8.2)
PTH-INTACT SERPL-MCNC: 36.9 PG/ML (ref 18.5–88)
RBC # BLD AUTO: 5.15 X10*6/UL (ref 4–5.2)
SODIUM SERPL-SCNC: 139 MMOL/L (ref 136–145)
WBC # BLD AUTO: 9.2 X10*3/UL (ref 4.4–11.3)

## 2025-01-17 PROCEDURE — 83970 ASSAY OF PARATHORMONE: CPT

## 2025-01-17 PROCEDURE — 83036 HEMOGLOBIN GLYCOSYLATED A1C: CPT

## 2025-01-17 PROCEDURE — 85025 COMPLETE CBC W/AUTO DIFF WBC: CPT

## 2025-01-17 PROCEDURE — 80053 COMPREHEN METABOLIC PANEL: CPT

## 2025-01-17 ASSESSMENT — ENCOUNTER SYMPTOMS
ABDOMINAL DISTENTION: 0
BACK PAIN: 0
HEADACHES: 0
WOUND: 0
COLOR CHANGE: 0
BRUISES/BLEEDS EASILY: 0
TROUBLE SWALLOWING: 0
DEPRESSION: 0
JOINT SWELLING: 0
FEVER: 0
OCCASIONAL FEELINGS OF UNSTEADINESS: 0
PALPITATIONS: 0
DIZZINESS: 0
SEIZURES: 0
WEAKNESS: 1
WHEEZING: 0
ADENOPATHY: 0
EYE PAIN: 0
CONSTIPATION: 0
COUGH: 0
ABDOMINAL PAIN: 0
LOSS OF SENSATION IN FEET: 0
CHILLS: 0
DIFFICULTY URINATING: 0
FATIGUE: 0
DIARRHEA: 0
SHORTNESS OF BREATH: 0
MYALGIAS: 0
NAUSEA: 0

## 2025-01-17 ASSESSMENT — ACTIVITIES OF DAILY LIVING (ADL)
DOING_HOUSEWORK: INDEPENDENT
TAKING_MEDICATION: INDEPENDENT
DRESSING: INDEPENDENT
GROCERY_SHOPPING: INDEPENDENT
BATHING: INDEPENDENT
MANAGING_FINANCES: INDEPENDENT

## 2025-01-17 ASSESSMENT — COLUMBIA-SUICIDE SEVERITY RATING SCALE - C-SSRS
6. HAVE YOU EVER DONE ANYTHING, STARTED TO DO ANYTHING, OR PREPARED TO DO ANYTHING TO END YOUR LIFE?: NO
2. HAVE YOU ACTUALLY HAD ANY THOUGHTS OF KILLING YOURSELF?: NO
1. IN THE PAST MONTH, HAVE YOU WISHED YOU WERE DEAD OR WISHED YOU COULD GO TO SLEEP AND NOT WAKE UP?: NO

## 2025-01-17 NOTE — ASSESSMENT & PLAN NOTE
Lab Results   Component Value Date    TSH 2.95 10/03/2024     Patient to continue current levothyroxine dosing.  Will continue to monitor TSH levels routinely.

## 2025-01-17 NOTE — ASSESSMENT & PLAN NOTE
Lab Results   Component Value Date    HGBA1C 6.6 (A) 01/17/2025     Hemoglobin A1c improved with reinitiation of glimepiride.  Patient encouraged to monitor blood glucose levels several times a week at home and to notify provider for any persistent issues with hypoglycemia or hyperglycemia.  Will continue to monitor hemoglobin A1c routinely to assess need for medication changes.

## 2025-01-17 NOTE — ASSESSMENT & PLAN NOTE
Routine blood work ordered today for assessment purposes. Will continue to monitor as appropriate  -Colonogaurd orders placed today with patient's constent.  Most recent colonoscopy completed in 2014 and was normal.    -Mammogram and DEXA screening orders in place for screening purposes

## 2025-01-17 NOTE — PATIENT INSTRUCTIONS
Orders are in for routine mammogram. For any future breast imaging appointments, please call [phone] ([phone]).     Orders are in place for you to complete imaging for additional assessment purposes (Bone density scan).  You can have this completed at [facility].  Please contact the radiology department there to schedule.  The number is [phone]     Please arrange for routine follow up in 3 months. You may schedule on-line through [employer] or call our office at [phone].

## 2025-01-17 NOTE — ASSESSMENT & PLAN NOTE
Patient continues on daily statin without issue.  Will continue to monitor fasting cholesterol panel routinely.

## 2025-01-17 NOTE — PROGRESS NOTES
Subjective   Patient ID: Erin Amin is a 65 y.o. female who presents for Follow-up (3 month ) and Medicare Annual Wellness Visit Subsequent.    Patient seen today to complete an annual Medicare wellness exam.  Present for today's assessment is patient's visit.  Patient remains on disability due to multiple chronic comorbidities and lives with her sister.  She reports poor vision, chronic balance and vestibular issues secondary to brain damage from melanoma?  Patient reports that she needs to follow-up with an optometrist but still remains too anxious to undergo extensive ocular testing as they might find out that things are wrong.  Patient ambulates independently and although she admits to weakness, she does not want to ambulate with an assistive device.  She is not interested in physical therapy services at this time.  No recently reported falls.  Patient does admit to some anxiety secondary to chronic illnesses.  She reports taking anxiety medications in the past but reports not tolerating them well.  Patient appears to have a good support system with her sister who is retired so they are at home together at all times.  No reported issues with appetite, staying hydrated, bowel or bladder.  She is a diabetic but does not monitor her blood glucose levels at home.  Patient denies any symptoms associated with hyperglycemia or hypoglycemia.  Sister states that patient does her best to follow a diabetic diet.  And feels like her current medication regiment has not been overly effective.  Physical activities due to multiple comorbidities.  Patient denies any shortness of breath or chest pain on exertion.  Patient follows routinely with cardiology due to history of CAD/CABG. Patient does not smoke and appears to follow a relatively well-balanced diet.  She reports trying to stay active the best she can within the home doing chores around the house.  Medications reviewed.  Patient denies any other acute concerns at this  time.     Current Outpatient Medications on File Prior to Visit   Medication Sig Dispense Refill   • blood sugar diagnostic (OneTouch Ultra Test) strip 1 strip once daily. 100 strip 2   • blood sugar diagnostic strip 1 each early in the morning.. 100 each 3   • blood-glucose meter misc One Touch glucometer: Patient to monitor blood glucose levels daily mornings before breakfast and as needed 1 each 0   • calcium carbonate-vitamin D3 (Calcium 600 with Vitamin D3) 600 mg-10 mcg (400 unit) chewable tablet Chew 1 tablet once daily.     • cholecalciferol (Vitamin D3) 400 unit capsule Take 5 capsules (50 mcg) by mouth once daily.     • clopidogrel (Plavix) 75 mg tablet TAKE ONE TABLET BY MOUTH ONCE DAILY 90 tablet 0   • dapagliflozin propanediol (Farxiga) 10 mg Take 1 tablet (10 mg) by mouth once daily in the morning. Take before meals. 90 tablet 1   • lancets misc Monitor blood glucose levels daily in the morning before breakfast 100 each 3   • levothyroxine (Synthroid, Levoxyl) 50 mcg tablet take 1 tablet by mouth once daily in the morning. take before meals. 90 tablet 0   • losartan (Cozaar) 50 mg tablet Take 1 tablet (50 mg) by mouth once daily. 90 tablet 1   • magnesium gluconate (Magonate) 27.5 mg magne- sium (500 mg) tablet Take 1 tablet (27.5 mg) by mouth once daily in the evening. Take with meals. 90 tablet 0   • metFORMIN (Glucophage) 500 mg tablet Take 2 tablets (1,000 mg) by mouth 2 times a day. 360 tablet 0   • OneTouch Delica Plus Lancet 33 gauge misc      • rosuvastatin (Crestor) 10 mg tablet Take 1 tablet (10 mg) by mouth once daily at bedtime. 90 tablet 1   • glimepiride (AmaryL) 1 mg tablet Take 0.5 tablets (0.5 mg) by mouth once daily in the morning. Take before meals. 15 tablet 2   • [DISCONTINUED] metFORMIN (Glucophage) 500 mg tablet Take 2 tablets (1,000 mg) by mouth 2 times a day. 360 tablet 1     No current facility-administered medications on file prior to visit.       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    Chorioretinal atrophy of left eye   • Unspecified visual field defects 09/26/2016    Visual field loss   • Unspecified visual field defects 09/26/2016    Visual field loss        Past Surgical History:   Procedure Laterality Date   • APPENDECTOMY  11/04/2013    Appendectomy   • CORONARY ARTERY BYPASS GRAFT  11/04/2013    CABG        Family History   Problem Relation Name Age of Onset   • Stroke Father     • Heart disease Father     • Stroke Other grandparent    • Heart disease Other grandparent    • Heart disease Other     • Diabetes Other          Review of Systems   Constitutional:  Negative for chills, fatigue and fever.   HENT:  Negative for dental problem and trouble swallowing.         Positive for chronic vestibular problems   Eyes:  Negative for pain and visual disturbance.        Positive for poor vision   Respiratory:  Negative for cough, shortness of breath and wheezing.    Cardiovascular:  Negative for chest pain, palpitations and leg swelling.   Gastrointestinal:  Negative for abdominal distention, abdominal pain, constipation, diarrhea and nausea.   Endocrine: Negative for cold intolerance and heat intolerance.   Genitourinary:  Negative for difficulty urinating.   Musculoskeletal:  Negative for back pain, gait problem, joint swelling and myalgias.   Skin:  Negative for color change, pallor, rash and wound.        Positive for melanoma history   Allergic/Immunologic: Negative for environmental allergies and food allergies.   Neurological:  Positive for weakness. Negative for dizziness, seizures and headaches.        Positive for balance issues   Hematological:  Negative for adenopathy. Does not bruise/bleed easily.  "  Psychiatric/Behavioral:  Negative for behavioral problems.         Patient admits to some mild anxiety   All other systems reviewed and are negative.      Objective   /78   Pulse 73   Ht 1.372 m (4' 6\")   Wt 54.6 kg (120 lb 6.4 oz)   SpO2 96%   BMI 29.03 kg/m²     Physical Exam  Constitutional:       General: She is not in acute distress.     Appearance: Normal appearance. She is not toxic-appearing.   HENT:      Head: Normocephalic and atraumatic.      Right Ear: External ear normal.      Left Ear: External ear normal.      Nose: Nose normal.      Mouth/Throat:      Mouth: Mucous membranes are moist.      Pharynx: Oropharynx is clear.   Eyes:      Extraocular Movements: Extraocular movements intact.      Conjunctiva/sclera: Conjunctivae normal.   Cardiovascular:      Rate and Rhythm: Normal rate and regular rhythm.      Pulses: Normal pulses.      Heart sounds: Normal heart sounds. No murmur heard.  Pulmonary:      Effort: Pulmonary effort is normal.      Breath sounds: Normal breath sounds. No wheezing.   Abdominal:      General: Bowel sounds are normal.      Palpations: Abdomen is soft.   Musculoskeletal:         General: No swelling.      Cervical back: Neck supple.   Skin:     General: Skin is warm and dry.   Neurological:      Mental Status: She is alert and oriented to person, place, and time. Mental status is at baseline.      Cranial Nerves: No cranial nerve deficit.      Motor: No weakness.   Psychiatric:         Mood and Affect: Mood normal.         Behavior: Behavior normal.         Thought Content: Thought content normal.         Judgment: Judgment normal.       Assessment/Plan   Problem List Items Addressed This Visit             ICD-10-CM    Acquired hypothyroidism E03.9     Lab Results   Component Value Date    TSH 2.95 10/03/2024     Patient to continue current levothyroxine dosing.  Will continue to monitor TSH levels routinely.         Anxiety F41.9     Continues to struggle with " anxiety due to having multiple complex health issues.  She is very anxious about eyes but becomes more anxious upon the thought of following up with a specialist to talk about these problems         Atherosclerotic heart disease of native coronary artery without angina pectoris I25.10     Patient continues on Plavix and rosuvastatin without issue.  She is maintain routine follow-up with cardiology for continued evaluation and treatment recommendations.  Next appointment in place with Dr. Alford in March, 2025.  Provider to be notified for any new cardiac concerns.         Chronic brain damage G93.9     Patient remains on disability and lives with her sister who helps her with tasks as needed.         Type 2 diabetes mellitus with hyperglycemia, without long-term current use of insulin E11.65     Lab Results   Component Value Date    HGBA1C 6.6 (A) 01/17/2025     Hemoglobin A1c improved with reinitiation of glimepiride.  Patient encouraged to monitor blood glucose levels several times a week at home and to notify provider for any persistent issues with hypoglycemia or hyperglycemia.  Will continue to monitor hemoglobin A1c routinely to assess need for medication changes.         Relevant Orders    POCT glycosylated hemoglobin (Hb A1C) manually resulted (Completed)    POCT Albumin Random Urine manually resulted (Completed)    Comprehensive Metabolic Panel    CBC and Auto Differential    Essential hypertension, benign I10     Relatively stable on current medication regiment.  Will continue to monitor vital signs at subsequent visits.  Provider to be notified for any issues associated with hypotension or hypertension         Hyperlipidemia E78.5     Patient continues on daily statin without issue.  Will continue to monitor fasting cholesterol panel routinely.         Malignant melanoma of left eye (Multi) C69.92     Discussed at length she is very concerned about her vision and history related to her eyes.  Encouraged  with patient to follow-up with specialist for additional evaluation/treatment options          Routine adult health maintenance Z00.00     Routine blood work ordered today for assessment purposes. Will continue to monitor as appropriate  -Colonogaurd orders placed today with patient's constent.  Most recent colonoscopy completed in 2014 and was normal.    -Mammogram and DEXA screening orders in place for screening purposes          Other Visit Diagnoses         Codes    Routine general medical examination at health care facility    -  Primary Z00.00    Relevant Orders    1 Year Follow Up In Primary Care - Wellness Exam    Menopause     Z78.0    Relevant Orders    XR DEXA bone density    Screening for osteoporosis     Z13.820    Relevant Orders    XR DEXA bone density    Encounter for screening mammogram for malignant neoplasm of breast     Z12.31    Relevant Orders    BI mammo bilateral screening tomosynthesis    Hypercalcemia     E83.52    Relevant Orders    PTH, intact    Encounter for colorectal cancer screening     Z12.11, Z12.12    Relevant Orders    Cologuard® colon cancer screening

## 2025-01-17 NOTE — ASSESSMENT & PLAN NOTE
Continues to struggle with anxiety due to having multiple complex health issues.  She is very anxious about eyes but becomes more anxious upon the thought of following up with a specialist to talk about these problems

## 2025-01-17 NOTE — ASSESSMENT & PLAN NOTE
Patient continues on Plavix and rosuvastatin without issue.  She is maintain routine follow-up with cardiology for continued evaluation and treatment recommendations.  Next appointment in place with Dr. Alford in March, 2025.  Provider to be notified for any new cardiac concerns.

## 2025-01-21 ENCOUNTER — TELEPHONE (OUTPATIENT)
Dept: PRIMARY CARE | Facility: CLINIC | Age: 65
End: 2025-01-21
Payer: MEDICARE

## 2025-01-21 NOTE — TELEPHONE ENCOUNTER
----- Message from Cathy Puente sent at 1/20/2025  4:48 PM EST -----  Can you update patient regarding most recent blood work?  Kidney function, blood counts and liver function are stable.  Calcium levels have normalized.  We will continue to monitor your blood work routinely.  Please notify the office if you have any additional questions or concerns

## 2025-01-21 NOTE — TELEPHONE ENCOUNTER
Result Communication    Resulted Orders   Hemoglobin A1C   Result Value Ref Range    Hemoglobin A1C 6.5 (H) See comment %    Estimated Average Glucose 140 Not Established mg/dL    Narrative    Diagnosis of Diabetes-Adults  Non-Diabetic: < or = 5.6%  Increased risk for developing diabetes: 5.7-6.4%  Diagnostic of diabetes: > or = 6.5%       Comprehensive Metabolic Panel   Result Value Ref Range    Glucose 112 (H) 74 - 99 mg/dL    Sodium 139 136 - 145 mmol/L    Potassium 5.0 3.5 - 5.3 mmol/L    Chloride 102 98 - 107 mmol/L    Bicarbonate 25 21 - 32 mmol/L    Anion Gap 17 10 - 20 mmol/L    Urea Nitrogen 18 6 - 23 mg/dL    Creatinine 0.81 0.50 - 1.05 mg/dL    eGFR 81 >60 mL/min/1.73m*2      Comment:      Calculations of estimated GFR are performed using the 2021 CKD-EPI Study Refit equation without the race variable for the IDMS-Traceable creatinine methods.  https://jasn.asnjournals.org/content/early/2021/09/22/ASN.6175508209    Calcium 10.1 8.6 - 10.3 mg/dL    Albumin 5.0 3.4 - 5.0 g/dL    Alkaline Phosphatase 68 33 - 136 U/L    Total Protein 7.0 6.4 - 8.2 g/dL    AST 20 9 - 39 U/L    Bilirubin, Total 0.6 0.0 - 1.2 mg/dL    ALT 24 7 - 45 U/L      Comment:      Patients treated with Sulfasalazine may generate falsely decreased results for ALT.   CBC and Auto Differential   Result Value Ref Range    WBC 9.2 4.4 - 11.3 x10*3/uL    nRBC 0.0 0.0 - 0.0 /100 WBCs    RBC 5.15 4.00 - 5.20 x10*6/uL    Hemoglobin 15.3 12.0 - 16.0 g/dL    Hematocrit 48.5 (H) 36.0 - 46.0 %    MCV 94 80 - 100 fL    MCH 29.7 26.0 - 34.0 pg    MCHC 31.5 (L) 32.0 - 36.0 g/dL    RDW 12.6 11.5 - 14.5 %    Platelets 307 150 - 450 x10*3/uL    Neutrophils % 65.8 40.0 - 80.0 %    Immature Granulocytes %, Automated 0.2 0.0 - 0.9 %      Comment:      Immature Granulocyte Count (IG) includes promyelocytes, myelocytes and metamyelocytes but does not include bands. Percent differential counts (%) should be interpreted in the context of the absolute cell counts  (cells/UL).    Lymphocytes % 25.1 13.0 - 44.0 %    Monocytes % 6.8 2.0 - 10.0 %    Eosinophils % 1.2 0.0 - 6.0 %    Basophils % 0.9 0.0 - 2.0 %    Neutrophils Absolute 6.05 1.20 - 7.70 x10*3/uL      Comment:      Percent differential counts (%) should be interpreted in the context of the absolute cell counts (cells/uL).    Immature Granulocytes Absolute, Automated 0.02 0.00 - 0.70 x10*3/uL    Lymphocytes Absolute 2.30 1.20 - 4.80 x10*3/uL    Monocytes Absolute 0.62 0.10 - 1.00 x10*3/uL    Eosinophils Absolute 0.11 0.00 - 0.70 x10*3/uL    Basophils Absolute 0.08 0.00 - 0.10 x10*3/uL   PTH, intact   Result Value Ref Range    Parathyroid Hormone, Intact 36.9 18.5 - 88.0 pg/mL       1:17 PM      Results were successfully communicated with the patient and they acknowledged their understanding.

## 2025-01-31 ENCOUNTER — HOSPITAL ENCOUNTER (OUTPATIENT)
Dept: RADIOLOGY | Facility: CLINIC | Age: 65
Discharge: HOME | End: 2025-01-31
Payer: MEDICARE

## 2025-01-31 VITALS — WEIGHT: 120.4 LBS | HEIGHT: 55 IN | BODY MASS INDEX: 27.86 KG/M2

## 2025-01-31 DIAGNOSIS — Z78.0 MENOPAUSE: ICD-10-CM

## 2025-01-31 DIAGNOSIS — Z12.31 ENCOUNTER FOR SCREENING MAMMOGRAM FOR MALIGNANT NEOPLASM OF BREAST: ICD-10-CM

## 2025-01-31 DIAGNOSIS — Z13.820 SCREENING FOR OSTEOPOROSIS: ICD-10-CM

## 2025-01-31 PROCEDURE — 77080 DXA BONE DENSITY AXIAL: CPT

## 2025-01-31 PROCEDURE — 77080 DXA BONE DENSITY AXIAL: CPT | Performed by: RADIOLOGY

## 2025-01-31 PROCEDURE — 77067 SCR MAMMO BI INCL CAD: CPT | Performed by: STUDENT IN AN ORGANIZED HEALTH CARE EDUCATION/TRAINING PROGRAM

## 2025-01-31 PROCEDURE — 77063 BREAST TOMOSYNTHESIS BI: CPT | Performed by: STUDENT IN AN ORGANIZED HEALTH CARE EDUCATION/TRAINING PROGRAM

## 2025-01-31 PROCEDURE — 77067 SCR MAMMO BI INCL CAD: CPT

## 2025-02-02 DIAGNOSIS — E11.65 TYPE 2 DIABETES MELLITUS WITH HYPERGLYCEMIA, WITHOUT LONG-TERM CURRENT USE OF INSULIN: ICD-10-CM

## 2025-02-03 ENCOUNTER — TELEPHONE (OUTPATIENT)
Dept: PRIMARY CARE | Facility: CLINIC | Age: 65
End: 2025-02-03
Payer: MEDICARE

## 2025-02-03 PROCEDURE — RXMED WILLOW AMBULATORY MEDICATION CHARGE

## 2025-02-03 RX ORDER — DAPAGLIFLOZIN 10 MG/1
10 TABLET, FILM COATED ORAL
Qty: 90 TABLET | Refills: 0 | Status: SHIPPED | OUTPATIENT
Start: 2025-02-03

## 2025-02-03 NOTE — TELEPHONE ENCOUNTER
----- Message from Cathy Puente sent at 2/2/2025 11:24 AM EST -----  Can you please update patient regarding most recent mammogram results?  Most recent mammogram reviewed and found to be negative for signs of malignancy. Routine screening follow-up recommended in one year. Please notify the office if you have any additional questions or concerns

## 2025-02-05 ENCOUNTER — PHARMACY VISIT (OUTPATIENT)
Dept: PHARMACY | Facility: CLINIC | Age: 65
End: 2025-02-05
Payer: MEDICARE

## 2025-02-09 ENCOUNTER — TELEPHONE (OUTPATIENT)
Dept: PRIMARY CARE | Facility: CLINIC | Age: 65
End: 2025-02-09
Payer: MEDICARE

## 2025-02-10 ENCOUNTER — TELEPHONE (OUTPATIENT)
Dept: PRIMARY CARE | Facility: CLINIC | Age: 65
End: 2025-02-10
Payer: MEDICARE

## 2025-02-10 DIAGNOSIS — G93.9 CHRONIC BRAIN DAMAGE: ICD-10-CM

## 2025-02-10 LAB — NONINV COLON CA DNA+OCC BLD SCRN STL QL: NEGATIVE

## 2025-02-10 NOTE — TELEPHONE ENCOUNTER
----- Message from Cathy Puente sent at 2/10/2025  3:42 PM EST -----  Can you please update patient regarding recitation Cologuard testing results? Recent Cologuard results reviewed and were negative for colorectal cancer concerns. Rescreening recommended in 3 years. Please notify the office if you have any additional questions or concerns.

## 2025-02-10 NOTE — TELEPHONE ENCOUNTER
Result Communication    Resulted Orders   POCT glycosylated hemoglobin (Hb A1C) manually resulted   Result Value Ref Range    POC HEMOGLOBIN A1c 6.6 (A) 4.2 - 6.5 %   POCT Albumin Random Urine manually resulted   Result Value Ref Range    POC Urine Albumin 10 No Reference Range Established mg/L    POC Urine Creatinine 10 No Reference Range Established mg/dl    POC ALBUMIN /CREATININE RATIO MANUALLY ENTERED 30 - 300 (A) <30 ug/mg Creat   Cologuard® colon cancer screening   Result Value Ref Range    NONINV COLON CA DNA+OCC BLD SCRN STL QL Negative Negative      Comment:      NEGATIVE TEST RESULT. A negative Cologuard result indicates a low likelihood that a colorectal cancer (CRC) or advanced adenoma (adenomatous polyps with more advanced pre-malignant features)  is present. The chance that a person with a negative Cologuard test has a colorectal cancer is less than 1 in 1500 (negative predictive value >99.9%) or has an  advanced adenoma is less than  5.3% (negative predictive value 94.7%). These data are based on a prospective cross-sectional study of 10,000 individuals at average risk for colorectal cancer who were screened with both Cologuard and colonoscopy. (Jamel SAMANIEGO et al, N Engl J Med 2014;370(14):8315-1817) The normal value (reference range) for this assay is negative.    COLOGUARD RE-SCREENING RECOMMENDATION: Periodic colorectal cancer screening is an important part of preventive healthcare for asymptomatic individuals at average risk for colorectal cancer.  Following a negative Cologuard result, the American Cancer Society and U.S.  Multi-Society Task Force screening guidelines recommend a Cologuard re-screening interval of 3 years.   References: American Cancer Society Guideline for Colorectal Cancer Screening: https://www.cancer.org/cancer/colon-rectal-cancer/cqmojyvan-kltzjsaul-zymsgbc/acs-recommendations.html.; Jose MINER, Chandra OJEDA, Jose NUÑEZ, Colorectal Cancer Screening: Recommendations for  Physicians and Patients from the U.S. Multi-Society Task Force on Colorectal Cancer Screening , Am J Gastroenterology 2017; 112:8957-7061.    TEST DESCRIPTION: Composite algorithmic analysis of stool DNA-biomarkers with hemoglobin immunoassay.   Quantitative values of individual biomarkers are not reportable and are not associated with individual biomarker result reference ranges. Cologuard is intended for colorectal cancer screening of adults of either sex, 45 years or older, who are at average-risk for colorectal cancer (CRC). Cologuard has been approved for use by the U.S. FDA. The performance of Cologuard was  established in a cross sectional study of average-risk adults aged 50-84. Cologuard performance in patients ages 45 to 49 years was estimated by sub-group analysis of near-age groups. Colonoscopies performed for a positive result may find as the most clinically significant lesion: colorectal cancer [4.0%], advanced adenoma (including sessile serrated polyps greater than or equal to 1cm diameter) [20%] or non- advanced adenoma [31%]; or no colorectal neoplasia [45%]. These estimates are derived from a prospective cross-sectional screening study of 10,000 individuals at average risk for colorectal cancer who were screened with both Cologuard and colonoscopy. (Jamel SANTIZO. et al, N Engl J Med 2014;370(14):2983-1558.) Cologuard may produce a false negative or false positive result (no colorectal cancer or precancerous polyp present at colonoscopy follow up). A negative Cologuard test result does not guarantee the absence of CRC or advanced adenoma (pre-cancer). The current Cologuard  screening interval is every 3 years. (American Cancer Society and U.S. Multi-Society Task Force). Cologuard performance data in a 10,000 patient pivotal study using colonoscopy as the reference method can be accessed at the following location: www.[email or web address]/results. Additional description of the Cologuard test process,  warnings and precautions can be found at [email or web address].         5:06 PM      Results were successfully communicated with the patient and they acknowledged their understanding.

## 2025-02-10 NOTE — PROGRESS NOTES
Patient called accidentally took extra 2 tablets of the Metformin. Advised to keep an eye out on her sugar . To check her sugar in the next hour and 2. To drink something sweet to get her sugar up and keep from dropping. Advised if really low to go to the ED. May need to hold her morning Metformin if her sugar is low

## 2025-02-17 ENCOUNTER — TELEPHONE (OUTPATIENT)
Dept: PRIMARY CARE | Facility: CLINIC | Age: 65
End: 2025-02-17
Payer: MEDICARE

## 2025-02-17 DIAGNOSIS — E03.9 ACQUIRED HYPOTHYROIDISM: ICD-10-CM

## 2025-02-17 RX ORDER — LEVOTHYROXINE SODIUM 50 UG/1
50 TABLET ORAL
Qty: 90 TABLET | Refills: 0 | Status: SHIPPED | OUTPATIENT
Start: 2025-02-17

## 2025-02-17 NOTE — TELEPHONE ENCOUNTER
Medication Refill Request:       Medication: levothyroxine (Synthroid, Levoxyl) 50 mcg tab  1 po every day     Qty: 90       LOV: 1/17/2025    NOV: 5/09/2025      Pharmacy: [facility]

## 2025-03-21 ENCOUNTER — OFFICE VISIT (OUTPATIENT)
Dept: CARDIOLOGY | Facility: CLINIC | Age: 65
End: 2025-03-21
Payer: MEDICARE

## 2025-03-21 VITALS
HEART RATE: 68 BPM | WEIGHT: 117.2 LBS | SYSTOLIC BLOOD PRESSURE: 130 MMHG | DIASTOLIC BLOOD PRESSURE: 64 MMHG | BODY MASS INDEX: 27.12 KG/M2 | OXYGEN SATURATION: 96 % | HEIGHT: 55 IN

## 2025-03-21 DIAGNOSIS — Z95.1 S/P CABG (CORONARY ARTERY BYPASS GRAFT): ICD-10-CM

## 2025-03-21 DIAGNOSIS — I25.10 ATHEROSCLEROSIS OF NATIVE CORONARY ARTERY OF NATIVE HEART WITHOUT ANGINA PECTORIS: Primary | ICD-10-CM

## 2025-03-21 PROCEDURE — 99213 OFFICE O/P EST LOW 20 MIN: CPT | Performed by: INTERNAL MEDICINE

## 2025-03-21 PROCEDURE — 3008F BODY MASS INDEX DOCD: CPT | Performed by: INTERNAL MEDICINE

## 2025-03-21 PROCEDURE — 4010F ACE/ARB THERAPY RXD/TAKEN: CPT | Performed by: INTERNAL MEDICINE

## 2025-03-21 PROCEDURE — G2211 COMPLEX E/M VISIT ADD ON: HCPCS | Performed by: INTERNAL MEDICINE

## 2025-03-21 PROCEDURE — 3074F SYST BP LT 130 MM HG: CPT | Performed by: INTERNAL MEDICINE

## 2025-03-21 PROCEDURE — 3078F DIAST BP <80 MM HG: CPT | Performed by: INTERNAL MEDICINE

## 2025-03-21 PROCEDURE — 3044F HG A1C LEVEL LT 7.0%: CPT | Performed by: INTERNAL MEDICINE

## 2025-03-21 RX ORDER — ACETAMINOPHEN 500 MG
4000 TABLET ORAL DAILY
COMMUNITY

## 2025-03-21 ASSESSMENT — PAIN SCALES - GENERAL: PAINLEVEL_OUTOF10: 0-NO PAIN

## 2025-03-21 NOTE — PROGRESS NOTES
Follow-up.....Primary Care Physician: Cathy Puente, APRN-CNP  Date of Visit: 03/21/2025  8:45 AM EDT  Location of visit: [facility] [place] [address]     Chief Complaint:   No chief complaint on file.    HPI / Summary:   Erin Amin is a 65 y.o. 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  Surgical Hx:   She has a past surgical history that includes Coronary artery bypass graft (11/04/2013) and Appendectomy (11/04/2013).   Social Hx:   She reports that she has never smoked. She has never used smokeless tobacco. She reports that she does not currently use alcohol. She reports that she does not use drugs.  Family Hx:   Her family history includes Breast cancer in her maternal grandmother and mother; Diabetes in an other family member; Heart disease in her father and other family members; Stroke in her father and another family member.   Allergies:  No Known Allergies  Outpatient Medications:  Current Outpatient Medications   Medication Instructions   • blood sugar diagnostic (OneTouch Ultra Test) strip 1 strip, miscellaneous, Daily   • blood sugar diagnostic strip 1 each, miscellaneous, Daily   • blood-glucose meter misc One Touch glucometer: Patient to monitor blood glucose levels daily mornings before breakfast and as needed   • calcium carbonate-vitamin D3 (Calcium 600 with Vitamin D3) 600 mg-10 mcg (400 unit) chewable tablet 1 tablet, Daily   • cholecalciferol (VITAMIN D3) 2,000 Units, Daily   • clopidogrel (PLAVIX) 75 mg, oral, Daily   • Farxiga 10 mg, oral, Daily before breakfast   • glimepiride (AMARYL) 0.5 mg, oral, Daily before breakfast   • lancets misc Monitor blood glucose levels daily in the morning  before breakfast   • levothyroxine (SYNTHROID, LEVOXYL) 50 mcg, oral, Daily before breakfast   • losartan (COZAAR) 50 mg, oral, Daily   • magnesium gluconate (Magonate) 27.5 mg magne- sium (500 mg) tablet 27.5 mg, oral, Daily with evening meal   • metFORMIN (GLUCOPHAGE) 1,000 mg, oral, 2 times daily   • OneTouch Delica Plus Lancet 33 gauge misc    • rosuvastatin (CRESTOR) 10 mg, oral, Nightly     Physical Exam:  There were no vitals filed for this visit.    Wt Readings from Last 5 Encounters:   01/31/25 54.6 kg (120 lb 6.4 oz)   01/17/25 54.6 kg (120 lb 6.4 oz)   10/03/24 55.9 kg (123 lb 3.2 oz)   09/06/24 56.5 kg (124 lb 9.6 oz)   03/29/24 55.8 kg (123 lb)     Physical Exam  JVP not elevated. Carotid impulses are 2+ without overlying bruit.   Chest exhibits fair to good air movement with completely clear breath sounds.   The cardiac rhythm is regular with no premature beats.   Normal S1 and S2. No gallop, murmur or rub, or click.   Abdomen is soft and benign without focal tenderness.   With no lower leg edema. The pedal pulses are intact.     Last Labs:  Lab on 01/17/2025   Component Date Value   • Hemoglobin A1C 01/17/2025 6.5 (H)    • Estimated Average Glucose 01/17/2025 140    • Glucose 01/17/2025 112 (H)    • Sodium 01/17/2025 139    • Potassium 01/17/2025 5.0    • Chloride 01/17/2025 102    • Bicarbonate 01/17/2025 25    • Anion Gap 01/17/2025 17    • Urea Nitrogen 01/17/2025 18    • Creatinine 01/17/2025 0.81    • eGFR 01/17/2025 81    • Calcium 01/17/2025 10.1    • Albumin 01/17/2025 5.0    • Alkaline Phosphatase 01/17/2025 68    • Total Protein 01/17/2025 7.0    • AST 01/17/2025 20    • Bilirubin, Total 01/17/2025 0.6    • ALT 01/17/2025 24    • WBC 01/17/2025 9.2    • nRBC 01/17/2025 0.0    • RBC 01/17/2025 5.15    • Hemoglobin 01/17/2025 15.3    • Hematocrit 01/17/2025 48.5 (H)    • MCV 01/17/2025 94    • MCH 01/17/2025 29.7    • MCHC 01/17/2025 31.5 (L)    • RDW 01/17/2025 12.6    • Platelets 01/17/2025  307    • Neutrophils % 01/17/2025 65.8    • Immature Granulocytes %,* 01/17/2025 0.2    • Lymphocytes % 01/17/2025 25.1    • Monocytes % 01/17/2025 6.8    • Eosinophils % 01/17/2025 1.2    • Basophils % 01/17/2025 0.9    • Neutrophils Absolute 01/17/2025 6.05    • Immature Granulocytes Ab* 01/17/2025 0.02    • Lymphocytes Absolute 01/17/2025 2.30    • Monocytes Absolute 01/17/2025 0.62    • Eosinophils Absolute 01/17/2025 0.11    • Basophils Absolute 01/17/2025 0.08    • Parathyroid Hormone, Int* 01/17/2025 36.9    Office Visit on 01/17/2025   Component Date Value   • POC HEMOGLOBIN A1c 01/17/2025 6.6 (A)    • POC Urine Albumin 01/17/2025 10    • POC Urine Creatinine 01/17/2025 10    • POC ALBUMIN /CREATININE * 01/17/2025 30 - 300 (A)    • NONINV COLON CA DNA+OCC * 02/02/2025 Negative    Lab on 10/03/2024   Component Date Value   • Glucose 10/03/2024 110 (H)    • Sodium 10/03/2024 141    • Potassium 10/03/2024 5.1    • Chloride 10/03/2024 104    • Bicarbonate 10/03/2024 27    • Anion Gap 10/03/2024 15    • Urea Nitrogen 10/03/2024 17    • Creatinine 10/03/2024 0.76    • eGFR 10/03/2024 88    • Calcium 10/03/2024 10.4 (H)    • Albumin 10/03/2024 4.9    • Alkaline Phosphatase 10/03/2024 79    • Total Protein 10/03/2024 7.4    • AST 10/03/2024 25    • Bilirubin, Total 10/03/2024 0.6    • ALT 10/03/2024 40    • WBC 10/03/2024 8.5    • nRBC 10/03/2024 0.0    • RBC 10/03/2024 5.21 (H)    • Hemoglobin 10/03/2024 15.8    • Hematocrit 10/03/2024 49.8 (H)    • MCV 10/03/2024 96    • MCH 10/03/2024 30.3    • MCHC 10/03/2024 31.7 (L)    • RDW 10/03/2024 12.6    • Platelets 10/03/2024 265    • Neutrophils % 10/03/2024 62.5    • Immature Granulocytes %,* 10/03/2024 0.4    • Lymphocytes % 10/03/2024 29.4    • Monocytes % 10/03/2024 5.4    • Eosinophils % 10/03/2024 1.5    • Basophils % 10/03/2024 0.8    • Neutrophils Absolute 10/03/2024 5.28    • Immature Granulocytes Ab* 10/03/2024 0.03    • Lymphocytes Absolute 10/03/2024 2.49     • Monocytes Absolute 10/03/2024 0.46    • Eosinophils Absolute 10/03/2024 0.13    • Basophils Absolute 10/03/2024 0.07    • Vitamin D, 25-Hydroxy, T* 10/03/2024 35    • Thyroid Stimulating Horm* 10/03/2024 2.95    • Cholesterol 10/03/2024 103    • HDL-Cholesterol 10/03/2024 39.9    • Cholesterol/HDL Ratio 10/03/2024 2.6    • LDL Calculated 10/03/2024 42    • VLDL 10/03/2024 21    • Triglycerides 10/03/2024 105    • Non HDL Cholesterol 10/03/2024 63    Office Visit on 10/03/2024   Component Date Value   • POC HEMOGLOBIN A1c 10/03/2024 7.4 (A)    • POC Urine Albumin 10/03/2024 10    • POC Urine Creatinine 10/03/2024 10    • POC ALBUMIN /CREATININE * 10/03/2024 30 - 300 (A)    • POC Color, Urine 10/03/2024 Light-Yellow    • POC Appearance, Urine 10/03/2024 Clear    • POC Glucose, Urine 10/03/2024 500 (3+) (A)    • POC Bilirubin, Urine 10/03/2024 NEGATIVE    • POC Ketones, Urine 10/03/2024 NEGATIVE    • POC Specific Gravity, Ur* 10/03/2024 1.010    • POC Blood, Urine 10/03/2024 NEGATIVE    • POC PH, Urine 10/03/2024 5.5    • POC Protein, Urine 10/03/2024 NEGATIVE    • POC Urobilinogen, Urine 10/03/2024 0.2    • Poc Nitrite, Urine 10/03/2024 NEGATIVE    • POC Leukocytes, Urine 10/03/2024 NEGATIVE         Assessment/Plan     1.  CAD, status post MI 9/11/2009.  2.  Status post CABG x 3 9/26/2009 with LIMA to LAD SVG to PDA SVG to lateral LCx branch.  3.  Status post MI #2 with 2 out of 3 graft occlusion.  4.  Status post MI x 3 with PCI and stent deployment 2010.  Patient is actually done relatively well over the past greater than 10 years.  She had a pharmacological nuclear stress test 10/14/2022 which showed a small sized area of moderate a fixed defect of the anterior wall to the apex consistent with prior infarction.  Echocardiogram 10/17/2022 estimated LV ejection fraction of 55 to 60% with abnormal septal motion due to prior thoracotomy.  Patient for uncertain reasons stop metoprolol claiming some adverse reaction  with her eyes.  She will be started on losartan 50 mg daily.  The patient did have a pharmacological nuclear stress test 10/14/2022 which showed a small sized area of moderate fixed defect involving the anterior wall to the apex consistent with prior infarction.  She had a echocardiogram performed on 10/17/2022 that showed a preserved LV ejection fraction of 55-60% with abnormal septal motion due to prior CABG.  For uncertain reasons the patient discontinued her metoprolol claiming that it bothered her eyes.  Patient will be started back on losartan 50 mg daily.  Patient currently Plavix 75 mg daily rather than aspirin.  Patient presently asymptomatic without chest discomfort or shortness of breath.  5.  Type 2 diabetes.  Lab work from 3/21/2024 includes glycohemoglobin of 6.9%.  Electrolyte panel was in normal range creatinine is 0.78 TSH 2.21 calcium 10.6.  UACR was 300.  CK was 45.  Continue metformin 500 mg twice daily Farxiga 10 mg daily unchanged.  The patient had been on glimepiride previously which was discontinued with the addition of the Farxiga.  Her glycohemoglobin values have been slightly higher on the Farxiga.  6.  Hyperlipidemia.  Lipid panel from 3/21/2024 satisfactory with cholesterol 126 LDL 54 HDL 43.  Continue rosuvastatin 10 mg daily.  7.  Hypothyroidism  8.  History of melanoma of the left.  9.  Hypertension.  Systolic blood pressure   10.?  Peripheral vascular disease.  Patient will be scheduled for a screening carotid ultrasound given her CAD.        Orders:  No orders of the defined types were placed in this encounter.     Followup Appts:  Future Appointments   Date Time Provider Department Center   4/8/2025 12:00 PM Dorcas Handley, PharmD [id number] [facility]   5/9/2025  8:40 AM Cathy Puente, APRN-CNP [id number] [facility]           ____________________________________________________________  Chris Alford MD  [facility] [place]  [facility]     Cathy Puente, APRN-CNP [id number] [facility]           ____________________________________________________________  MD [facility]     5

## 2025-03-24 ENCOUNTER — TELEPHONE (OUTPATIENT)
Dept: PRIMARY CARE | Facility: CLINIC | Age: 65
End: 2025-03-24
Payer: MEDICARE

## 2025-03-24 DIAGNOSIS — E78.2 MIXED HYPERLIPIDEMIA: ICD-10-CM

## 2025-03-24 RX ORDER — ROSUVASTATIN CALCIUM 10 MG/1
10 TABLET, COATED ORAL NIGHTLY
Qty: 90 TABLET | Refills: 1 | Status: SHIPPED | OUTPATIENT
Start: 2025-03-24

## 2025-03-24 NOTE — TELEPHONE ENCOUNTER
Rx Refill Request Telephone Encounter    Name:  Erin Amin  :  [id number]  Medication Name:    ATROVASTATIN 10MG Q/D 90 DAY   Specific Pharmacy location:  [facility]   Date of last appointment:  2025  Date of next appointment:  2025  Best number to reach patient:  [phone]

## 2025-04-08 ENCOUNTER — APPOINTMENT (OUTPATIENT)
Dept: PHARMACY | Facility: HOSPITAL | Age: 65
End: 2025-04-08
Payer: MEDICARE

## 2025-04-08 DIAGNOSIS — E11.65 TYPE 2 DIABETES MELLITUS WITH HYPERGLYCEMIA, WITHOUT LONG-TERM CURRENT USE OF INSULIN: Primary | ICD-10-CM

## 2025-04-08 DIAGNOSIS — E11.65 TYPE 2 DIABETES MELLITUS WITH HYPERGLYCEMIA, WITHOUT LONG-TERM CURRENT USE OF INSULIN: ICD-10-CM

## 2025-04-08 RX ORDER — DAPAGLIFLOZIN 10 MG/1
10 TABLET, FILM COATED ORAL
Qty: 90 TABLET | Refills: 3 | Status: SHIPPED | OUTPATIENT
Start: 2025-04-08

## 2025-04-08 NOTE — PROGRESS NOTES
Outpatient Clinical Pharmacy Visit  Erin Amin is a 65 y.o. female who was referred to the ambulatory Clinical Pharmacy Team for their Diabetes.    Referring Provider: Cathy Puente, LIV-TAI AND Dr. Taurus Jacobsen, DO    Allergies  Allergies   Allergen Reactions    Cat Dander Runny nose     sneezing       Preferred Pharmacy  [id number] - [place], [place] - [address]  [address] [address]  Phone: [phone] Fax: [phone]    [facility]  [address], [address]  [facility] [address]  Phone: [phone] Fax: [phone]    Medication Access  Cost barriers: N/A  Enrolled in  PAP: Yes, expires 5/2/2025 (Farxiga)  Manages own medication: Yes  Transportation to the pharmacy: Yes  Frequency of missed doses: Rare    Last Visit  At last Clinical Pharmacy visit, continued Farxiga due to  Patient Assistance Program approval.     Diabetes Assessment  Today, Erin reports that she is doing well on Farxiga. While she does report increased urination, she denies any UTIs, vaginal yeast infections, skin infections, pain/burning with urination.     She does complain of loose stool/diarrhea with recent increase in metformin IR to 500 mg, 2 tabs BID. Discussed with Erin at length that metformin IR can more commonly cause some loose stool, and that trialing metformin ER may be more beneficial.     Also discussed that while still taking a total of 4 tablets daily, she can 'move' the timing/frequency of the metformin IR throughout the day to see if this helps better control the loose stool.     Current Diabetes Medication Regimen    Metformin  mg, 2 tabs BID  Farxiga 10 mg daily     Secondary Prevention  Statin? Yes, describe: rosuvastatin 10 mg daily  ACE-I/ARB? Yes, describe: losartan 50 mg daily  Aspirin? No     Pertinent PMH Review:  PMH of Pancreatitis: N/A  PMH of Retinopathy: No, however extensive vision issues due to previous medical history  PMH/  "of Medullary Thyroid Carcinoma or Multiple Endocrine Neoplasia Type 2: N/A  PMH of Urinary Tract Infections: No     Health Maintenance:   Foot Exam: Not discussed due to time  Eye Exam: 2022- another eye exam scheduled 5/14/24  Lipid Panel: LDL 54 mg/dL and triglycerides 143 mg/dL as of 3/21/24     DIET: She reports that her diet has not been \"as good\" as normal and needs to improve her eating habits.   EXERCISE: Not discussed due to time     Current monitoring regimen:   Patient is using: glucometer  Testing frequency: Cannot see to test BG  Barriers to testing: Cannot see to test BG     Patient-Reported Blood Glucose:  Not testing due to eye testing- recent A1c of 6.5% 1/17/25     Has the patient experienced any low sugars since last contact? Not discussed due to time  Unknown symptoms of low blood glucose: sweaty, shaky, anxious, excessive hunger, confusion, lightheaded, nauseous, blurred vision  Has the patient experienced any high sugars since last contact? Not discussed due to time  Unknown symptoms of high blood glucose: excessive thirst, frequent urination, fatigue, nausea, shortness of breath, trouble concentrating     PAP Eligibility Assessment  This patient has expressed a need for medication cost assistance and will be considered for the  Patient Assistance Program ( PAP). If patient is approved, the below medications would result in a $0 cost/month for the patient. If approved, patient must then provide updated financial documents each calendar year and have a new prescription issued by a  Meds pharmacist to continue receiving medications at $0 cost.    Medications  Farxiga 10 mg daily    Eligibility Requirements  [x] Patient has pre-existing prescription coverage  [x] Medication(s) above are listed on  PAP formulary  [x] Income: Less than or equal to 400% Federal Poverty Limit  Household Persons: 2  [x] Patient's prescription insurance is contracted with either [facility], [facility] or  Specialty " Pharmacy    2025* Poverty Guidelines for the [address] [facility]   Persons in Family/Household Poverty Guideline Annual 400% Monthly 400%   1 $15,650 $62,600 $5,216   2 $21,150 $84,600 $7,050   3 $26,650 $106,600 $8,883   4 $32,150 $128,600 $10,716   5 $37,650 $150,600 $12,550   6 $43,150 $172,600 $14,383   7 $48,650 $194,600 $16,216   8 $54,150 $216,600 $18,050   *[facility] updates FPL guidelines in late January of each calendar year.      Initial Eligibility Determination  Note: Clinical Pharmacy is not responsible for finalizing eligibility approval. The  PAP team determines final eligibility.  This patient would likely qualify for the  PAP program.    Financial Documents Required for Application Submission  [] 1040 Tax Form     Laboratory Results  Lab Results   Component Value Date    BILITOT 0.6 01/17/2025    CALCIUM 10.1 01/17/2025    CO2 25 01/17/2025     01/17/2025    CREATININE 0.81 01/17/2025    GLUCOSE 112 (H) 01/17/2025    ALKPHOS 68 01/17/2025    K 5.0 01/17/2025    PROT 7.0 01/17/2025     01/17/2025    AST 20 01/17/2025    ALT 24 01/17/2025    BUN 18 01/17/2025    ANIONGAP 17 01/17/2025    ALBUMIN 5.0 01/17/2025    GFRF >90 09/18/2023    EGFR 81 01/17/2025     Lab Results   Component Value Date    TRIG 105 10/03/2024    CHOL 103 10/03/2024    HDL 39.9 10/03/2024     Lab Results   Component Value Date    HGBA1C 6.5 (H) 01/17/2025       Assessment/Plan   Problem List Items Addressed This Visit       Type 2 diabetes mellitus with hyperglycemia, without long-term current use of insulin    Relevant Medications    dapagliflozin propanediol (Farxiga) 10 mg tablet    Other Relevant Orders    Referral to Clinical Pharmacy     Assessment  Kristies diabetes is currently well controlled, as evidenced by recent A1c of 6.5% as of 1/17/25.  She is tolerating Farxiga well. It is appropriate for her to continue taking.   Likely be approved again for  PAP- she will turn in financials.   Farxiga Education:    Counseled patient on Farxiga MOA, expectations, side effects, duration of therapy, administration, and monitoring parameters.  Reviewed the benefits of SGLT-2i therapy, such as glycemic control, kidney protection, and cardiovascular protection.  Advised patient to practice proper  hygiene to reduce risk of UTIs or yeast infections.  Advised patient to maintain adequate fluid intake to remain hydrated while on SGLT-2i therapy. If patient becomes acutely ill, characterized by decreased oral intake and/or increased volume loss due to diarrhea/vomiting, patient advised to abstain from Farxiga use until they have returned to baseline.   Answered all patient questions and concerns.     Plan/Changes   Continue all meds under the continuation of care with the referring provider and clinical pharmacy team  Start Farxiga 10 mg daily, send to [facility] for cost assistance  Start  PAP re-application process- Erin to turn in/email financials to me.     Next Clinical Pharmacy Follow-Up  2 weeks   PAP progress- documents/determination      Jose Maria ShearerD     Verbal consent to manage patient's drug therapy was obtained from the patient. They were informed they may decline to participate or withdraw from participation in pharmacy services at any time.

## 2025-04-08 NOTE — Clinical Note
Cathy- this is a previous patient under Dr. Jacobsen that I assisted with  PAP for [facility]. Transitioning this referral to under your name. Have initiated  PAP re-enrollment. Follow-up 2 weeks.

## 2025-04-12 PROCEDURE — RXMED WILLOW AMBULATORY MEDICATION CHARGE

## 2025-04-14 DIAGNOSIS — Z95.1 S/P CABG (CORONARY ARTERY BYPASS GRAFT): ICD-10-CM

## 2025-04-14 DIAGNOSIS — E11.65 TYPE 2 DIABETES MELLITUS WITH HYPERGLYCEMIA, WITHOUT LONG-TERM CURRENT USE OF INSULIN: ICD-10-CM

## 2025-04-14 RX ORDER — CLOPIDOGREL BISULFATE 75 MG/1
75 TABLET ORAL DAILY
Qty: 90 TABLET | Refills: 0 | Status: SHIPPED | OUTPATIENT
Start: 2025-04-14

## 2025-04-14 RX ORDER — METFORMIN HYDROCHLORIDE 500 MG/1
1000 TABLET ORAL 2 TIMES DAILY
Qty: 360 TABLET | Refills: 0 | Status: SHIPPED | OUTPATIENT
Start: 2025-04-14

## 2025-04-15 ENCOUNTER — PHARMACY VISIT (OUTPATIENT)
Dept: PHARMACY | Facility: CLINIC | Age: 65
End: 2025-04-15
Payer: MEDICARE

## 2025-04-22 ENCOUNTER — APPOINTMENT (OUTPATIENT)
Dept: PHARMACY | Facility: HOSPITAL | Age: 65
End: 2025-04-22
Payer: MEDICARE

## 2025-04-29 ENCOUNTER — APPOINTMENT (OUTPATIENT)
Dept: PHARMACY | Facility: HOSPITAL | Age: 65
End: 2025-04-29
Payer: MEDICARE

## 2025-05-05 ENCOUNTER — APPOINTMENT (OUTPATIENT)
Dept: PRIMARY CARE | Facility: CLINIC | Age: 65
End: 2025-05-05
Payer: MEDICARE

## 2025-05-05 VITALS
WEIGHT: 120 LBS | HEIGHT: 55 IN | HEART RATE: 55 BPM | OXYGEN SATURATION: 99 % | DIASTOLIC BLOOD PRESSURE: 72 MMHG | SYSTOLIC BLOOD PRESSURE: 126 MMHG | BODY MASS INDEX: 27.77 KG/M2

## 2025-05-05 DIAGNOSIS — Z13.220 SCREENING FOR CHOLESTEROL LEVEL: ICD-10-CM

## 2025-05-05 DIAGNOSIS — E83.52 HYPERCALCEMIA: ICD-10-CM

## 2025-05-05 DIAGNOSIS — Z13.1 SCREENING FOR DIABETES MELLITUS: ICD-10-CM

## 2025-05-05 DIAGNOSIS — E78.2 MODERATE MIXED HYPERLIPIDEMIA NOT REQUIRING STATIN THERAPY: ICD-10-CM

## 2025-05-05 DIAGNOSIS — F41.9 ANXIETY: ICD-10-CM

## 2025-05-05 DIAGNOSIS — Z13.21 ENCOUNTER FOR VITAMIN DEFICIENCY SCREENING: ICD-10-CM

## 2025-05-05 DIAGNOSIS — Z13.220 ENCOUNTER FOR LIPID SCREENING FOR CARDIOVASCULAR DISEASE: ICD-10-CM

## 2025-05-05 DIAGNOSIS — C69.92 MALIGNANT MELANOMA OF LEFT EYE (MULTI): ICD-10-CM

## 2025-05-05 DIAGNOSIS — Z13.29 SCREENING FOR THYROID DISORDER: ICD-10-CM

## 2025-05-05 DIAGNOSIS — R73.09 BLOOD GLUCOSE ABNORMAL: ICD-10-CM

## 2025-05-05 DIAGNOSIS — M81.0 AGE-RELATED OSTEOPOROSIS WITHOUT CURRENT PATHOLOGICAL FRACTURE: ICD-10-CM

## 2025-05-05 DIAGNOSIS — E11.65 TYPE 2 DIABETES MELLITUS WITH HYPERGLYCEMIA, WITHOUT LONG-TERM CURRENT USE OF INSULIN: ICD-10-CM

## 2025-05-05 DIAGNOSIS — Z79.899 ON LONG TERM DRUG THERAPY: ICD-10-CM

## 2025-05-05 DIAGNOSIS — E55.9 VITAMIN D DEFICIENCY: ICD-10-CM

## 2025-05-05 DIAGNOSIS — I10 ESSENTIAL HYPERTENSION, BENIGN: ICD-10-CM

## 2025-05-05 DIAGNOSIS — Z13.6 ENCOUNTER FOR LIPID SCREENING FOR CARDIOVASCULAR DISEASE: ICD-10-CM

## 2025-05-05 DIAGNOSIS — Z00.00 ROUTINE ADULT HEALTH MAINTENANCE: Primary | ICD-10-CM

## 2025-05-05 DIAGNOSIS — I25.10 ATHEROSCLEROSIS OF NATIVE CORONARY ARTERY OF NATIVE HEART WITHOUT ANGINA PECTORIS: ICD-10-CM

## 2025-05-05 PROBLEM — Z95.1 S/P CABG (CORONARY ARTERY BYPASS GRAFT): Status: RESOLVED | Noted: 2023-02-03 | Resolved: 2025-05-05

## 2025-05-05 PROBLEM — H93.3X9 VESTIBULAR NERVE DISORDER: Status: RESOLVED | Noted: 2025-01-14 | Resolved: 2025-05-05

## 2025-05-05 PROBLEM — F41.8 DEPRESSION WITH ANXIETY: Status: RESOLVED | Noted: 2023-02-03 | Resolved: 2025-05-05

## 2025-05-05 LAB — POC HEMOGLOBIN A1C: 6.1 % (ref 4.2–6.5)

## 2025-05-05 PROCEDURE — 3008F BODY MASS INDEX DOCD: CPT | Performed by: NURSE PRACTITIONER

## 2025-05-05 PROCEDURE — 4010F ACE/ARB THERAPY RXD/TAKEN: CPT | Performed by: NURSE PRACTITIONER

## 2025-05-05 PROCEDURE — 1036F TOBACCO NON-USER: CPT | Performed by: NURSE PRACTITIONER

## 2025-05-05 PROCEDURE — 83036 HEMOGLOBIN GLYCOSYLATED A1C: CPT | Performed by: NURSE PRACTITIONER

## 2025-05-05 PROCEDURE — 99214 OFFICE O/P EST MOD 30 MIN: CPT | Performed by: NURSE PRACTITIONER

## 2025-05-05 PROCEDURE — 3044F HG A1C LEVEL LT 7.0%: CPT | Performed by: NURSE PRACTITIONER

## 2025-05-05 PROCEDURE — G2211 COMPLEX E/M VISIT ADD ON: HCPCS | Performed by: NURSE PRACTITIONER

## 2025-05-05 PROCEDURE — 3078F DIAST BP <80 MM HG: CPT | Performed by: NURSE PRACTITIONER

## 2025-05-05 PROCEDURE — 3074F SYST BP LT 130 MM HG: CPT | Performed by: NURSE PRACTITIONER

## 2025-05-05 RX ORDER — METFORMIN HYDROCHLORIDE 500 MG/1
1000 TABLET, EXTENDED RELEASE ORAL
Qty: 120 TABLET | Refills: 2 | Status: SHIPPED | OUTPATIENT
Start: 2025-05-05 | End: 2025-08-03

## 2025-05-05 ASSESSMENT — ENCOUNTER SYMPTOMS
DIARRHEA: 1
ABDOMINAL DISTENTION: 0
CONSTIPATION: 0
PALPITATIONS: 0
DIZZINESS: 0
ABDOMINAL PAIN: 0
JOINT SWELLING: 0
FEVER: 0
HEADACHES: 0
COUGH: 0
TROUBLE SWALLOWING: 0
DEPRESSION: 0
COLOR CHANGE: 0
OCCASIONAL FEELINGS OF UNSTEADINESS: 0
ADENOPATHY: 0
BRUISES/BLEEDS EASILY: 0
LOSS OF SENSATION IN FEET: 0
EYE PAIN: 0
BACK PAIN: 0
CHILLS: 0
ROS GI COMMENTS: SEE HPI
NAUSEA: 0
SEIZURES: 0
WOUND: 0
MYALGIAS: 0
WHEEZING: 0
SHORTNESS OF BREATH: 0
WEAKNESS: 1
FATIGUE: 0
DIFFICULTY URINATING: 0

## 2025-05-05 ASSESSMENT — PATIENT HEALTH QUESTIONNAIRE - PHQ9
2. FEELING DOWN, DEPRESSED OR HOPELESS: NOT AT ALL
SUM OF ALL RESPONSES TO PHQ9 QUESTIONS 1 AND 2: 0
1. LITTLE INTEREST OR PLEASURE IN DOING THINGS: NOT AT ALL

## 2025-05-05 NOTE — ASSESSMENT & PLAN NOTE
Routine blood work ordered today for assessment purposes. Will continue to monitor as appropriate  -Cologaurd testing completed in January, 2025 and was negative. Will plan on retesting in 3 years (2028)  -Annual mammograms to be completed in January  -Bone density screening completed in January, 2025 and showed osteoporosis.  Patient reports intolerance to oral bisphosphonates and is hesitant to start Prolia or other injectables.  Encourage patient to follow-up with clinical pharmacy team later this month to discuss additional options moving forward

## 2025-05-05 NOTE — ASSESSMENT & PLAN NOTE
Lab Results   Component Value Date    HGBA1C 6.1 05/05/2025     Hemoglobin A1c improved.  She is now following with clinical pharmacy team.  Will transition to extended release metformin due to some new diarrhea/loose stools.  Patient encouraged to monitor blood glucose levels several times a week at home and to notify provider for any persistent issues with hypoglycemia or hyperglycemia.  Will continue to monitor hemoglobin A1c routinely to assess need for medication changes.

## 2025-05-05 NOTE — ASSESSMENT & PLAN NOTE
"Per cardiology, \"1.  CAD, status post MI 9/11/2009. 2.  Status post CABG x 3 9/26/2009 with LIMA to LAD SVG to PDA SVG to lateral LCx branch. 3.  Status post MI #2 with 2 out of 3 graft occlusion. 4.  Status post MI x 3 with PCI and stent deployment 2010\".  Patient continues on Plavix and rosuvastatin without issue.  She is maintain routine follow-up with cardiology for continued evaluation and treatment recommendations. Provider to be notified for any new cardiac concerns.  "

## 2025-05-05 NOTE — ASSESSMENT & PLAN NOTE
Bone density screening completed in January, 2025 and showed osteoporosis.  Patient reports intolerance to oral bisphosphonates and is hesitant to start Prolia or other injectables.  Encourage patient to follow-up with clinical pharmacy team later this month to discuss additional options moving forward

## 2025-05-05 NOTE — PROGRESS NOTES
Subjective   Patient ID: Erin Amin is a 65 y.o. female who presents for Follow-up (3 month ).    Patient seen today for routine follow-up and medication management.  Present for today's assessment is patient's visit.  Patient remains on disability due to multiple chronic comorbidities and lives with her sister.  She reports poor vision, chronic balance and vestibular issues secondary to brain damage from melanoma?  Patient reports that she needs to follow-up with an optometrist but still remains too anxious to undergo extensive ocular testing as they might find out that things are wrong.  Patient ambulates independently and although she admits to weakness, she does not want to ambulate with an assistive device.  She is not interested in physical therapy services at this time.  No recently reported falls.  Patient does admit to some anxiety secondary to chronic illnesses.  She reports taking anxiety medications in the past but reports not tolerating them well.  Patient appears to have a good support system with her sister who is retired so they are at home together at all times.  No reported issues with appetite, staying hydrated, or bladder.  She does report some issues with intermittent diarrhea that have occurred over the past several months.  Patient is now following routinely with clinical pharmacy team for diabetes and it was mentioned that her metformin might be the culprit.  Patient is agreeable to trial extended release metformin to see if this helps to improve her GI disturbances.  No reported abdominal pain, cramping, blood in the stool or other bowel concerns.   She is a diabetic but does not monitor her blood glucose levels at home.  Patient denies any symptoms associated with hyperglycemia or hypoglycemia.  Sister states that patient does her best to follow a diabetic diet.  Physical activities due to multiple comorbidities.  Patient denies any shortness of breath or chest pain on exertion.  Patient  follows routinely with cardiology due to history of CAD/CABG. Patient does not smoke and appears to follow a relatively well-balanced diet.  She reports trying to stay active the best she can within the home doing chores around the house.  Medications reviewed.  Patient denies any other acute concerns at this time.         Current Outpatient Medications on File Prior to Visit   Medication Sig Dispense Refill    blood sugar diagnostic strip 1 each early in the morning.. 100 each 3    blood-glucose meter misc One Touch glucometer: Patient to monitor blood glucose levels daily mornings before breakfast and as needed 1 each 0    cholecalciferol (Vitamin D-3) 50 mcg (2,000 unit) capsule Take 2 capsules (4,000 Units) by mouth once daily.      clopidogrel (Plavix) 75 mg tablet Take 1 tablet (75 mg) by mouth once daily. 90 tablet 0    dapagliflozin propanediol (Farxiga) 10 mg tablet Take 1 tablet (10 mg) by mouth once daily in the morning. Take before meals. 90 tablet 3    lancets misc Monitor blood glucose levels daily in the morning before breakfast 100 each 3    levothyroxine (Synthroid, Levoxyl) 50 mcg tablet Take 1 tablet (50 mcg) by mouth once daily in the morning. Take before meals. 90 tablet 0    losartan (Cozaar) 50 mg tablet Take 1 tablet (50 mg) by mouth once daily. (Patient taking differently: Take 1 tablet (50 mg) by mouth once daily in the evening.) 90 tablet 1    rosuvastatin (Crestor) 10 mg tablet Take 1 tablet (10 mg) by mouth once daily at bedtime. 90 tablet 1    [DISCONTINUED] metFORMIN (Glucophage) 500 mg tablet Take 2 tablets (1,000 mg) by mouth 2 times a day. 360 tablet 0    blood sugar diagnostic (OneTouch Ultra Test) strip 1 strip once daily. 100 strip 2    cholecalciferol (Vitamin D3) 400 unit capsule Take 5 capsules (50 mcg) by mouth once daily. (Patient not taking: Reported on 3/21/2025)      magnesium gluconate (Magonate) 27.5 mg magne- sium (500 mg) tablet Take 1 tablet (27.5 mg) by mouth once  daily in the evening. Take with meals. (Patient not taking: Reported on 5/5/2025) 90 tablet 0    OneTouch Delica Plus Lancet 33 gauge misc  (Patient not taking: Reported on 5/5/2025)      [DISCONTINUED] calcium carbonate-vitamin D3 (Calcium 600 with Vitamin D3) 600 mg-10 mcg (400 unit) chewable tablet Chew 1 tablet once daily. (Patient not taking: Reported on 3/21/2025)       No current facility-administered medications on file prior to visit.       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/2019    Chorioretinal atrophy of left eye    Unspecified visual field defects 09/26/2016    Visual field loss    Unspecified visual field defects 09/26/2016    Visual field loss        Past Surgical History:   Procedure Laterality Date    APPENDECTOMY  11/04/2013    Appendectomy    CORONARY ARTERY BYPASS GRAFT  11/04/2013    CABG        Family History   Problem Relation Name Age of Onset    Breast cancer Mother      Stroke Father      Heart disease Father      Breast cancer Maternal Grandmother      Stroke Other grandparent     Heart disease Other grandparent     Heart disease Other      Diabetes Other          Review of Systems   Constitutional:  Negative for chills, fatigue and fever.   HENT:  Negative for dental problem and trouble swallowing.         Positive for chronic vestibular problems   Eyes:  Negative for pain and visual disturbance.        Positive for poor vision   Respiratory:  Negative for cough, shortness of breath and wheezing.    Cardiovascular:  Negative for chest pain, palpitations and leg swelling.   Gastrointestinal:  Positive for diarrhea. Negative for abdominal distention, abdominal pain, constipation and nausea.        See HPI   Endocrine: Negative for cold  "intolerance and heat intolerance.   Genitourinary:  Negative for difficulty urinating.   Musculoskeletal:  Negative for back pain, gait problem, joint swelling and myalgias.   Skin:  Negative for color change, pallor, rash and wound.        Positive for melanoma history   Allergic/Immunologic: Negative for environmental allergies and food allergies.   Neurological:  Positive for weakness. Negative for dizziness, seizures and headaches.        Positive for balance issues   Hematological:  Negative for adenopathy. Does not bruise/bleed easily.   Psychiatric/Behavioral:  Negative for behavioral problems.         Patient admits to some mild anxiety   All other systems reviewed and are negative.      Objective   /72   Pulse 55   Ht 1.372 m (4' 6\")   Wt 54.4 kg (120 lb)   SpO2 99%   BMI 28.93 kg/m²     Physical Exam  Constitutional:       General: She is not in acute distress.     Appearance: Normal appearance. She is not toxic-appearing.   HENT:      Head: Normocephalic and atraumatic.      Right Ear: Tympanic membrane, ear canal and external ear normal.      Left Ear: Tympanic membrane, ear canal and external ear normal.      Nose: Nose normal.      Mouth/Throat:      Mouth: Mucous membranes are moist.      Pharynx: Oropharynx is clear.   Eyes:      Extraocular Movements: Extraocular movements intact.      Conjunctiva/sclera: Conjunctivae normal.   Cardiovascular:      Rate and Rhythm: Normal rate and regular rhythm.      Pulses: Normal pulses.      Heart sounds: Normal heart sounds. No murmur heard.  Pulmonary:      Effort: Pulmonary effort is normal.      Breath sounds: Normal breath sounds. No wheezing.   Abdominal:      General: Bowel sounds are normal.      Palpations: Abdomen is soft.   Musculoskeletal:         General: No swelling.      Cervical back: Neck supple.   Skin:     General: Skin is warm and dry.   Neurological:      Mental Status: She is alert and oriented to person, place, and time. Mental " "status is at baseline.      Cranial Nerves: No cranial nerve deficit.      Motor: No weakness.   Psychiatric:         Mood and Affect: Mood normal.         Behavior: Behavior normal.         Thought Content: Thought content normal.         Judgment: Judgment normal.         Assessment/Plan   Problem List Items Addressed This Visit           ICD-10-CM    Anxiety F41.9    Continues to struggle with anxiety due to having multiple complex health issues.  She is very anxious about eyes but becomes more anxious upon the thought of following up with a specialist to talk about these problems         Atherosclerotic heart disease of native coronary artery without angina pectoris I25.10    Per cardiology, \"1.  CAD, status post MI 9/11/2009. 2.  Status post CABG x 3 9/26/2009 with LIMA to LAD SVG to PDA SVG to lateral LCx branch. 3.  Status post MI #2 with 2 out of 3 graft occlusion. 4.  Status post MI x 3 with PCI and stent deployment 2010\".  Patient continues on Plavix and rosuvastatin without issue.  She is maintain routine follow-up with cardiology for continued evaluation and treatment recommendations. Provider to be notified for any new cardiac concerns.         Type 2 diabetes mellitus with hyperglycemia, without long-term current use of insulin E11.65    Lab Results   Component Value Date    HGBA1C 6.1 05/05/2025     Hemoglobin A1c improved.  She is now following with clinical pharmacy team.  Will transition to extended release metformin due to some new diarrhea/loose stools.  Patient encouraged to monitor blood glucose levels several times a week at home and to notify provider for any persistent issues with hypoglycemia or hyperglycemia.  Will continue to monitor hemoglobin A1c routinely to assess need for medication changes.         Relevant Medications    metFORMIN  mg 24 hr tablet    Other Relevant Orders    POCT glycosylated hemoglobin (Hb A1C) manually resulted (Completed)    Hemoglobin A1C    Comprehensive " Metabolic Panel    CBC and Auto Differential    Essential hypertension, benign I10    Relatively stable on current medication regiment.  Will continue to monitor vital signs at subsequent visits.  Provider to be notified for any issues associated with hypotension or hypertension         Hyperlipidemia E78.5    Patient continues on daily statin without issue.  Will continue to monitor fasting cholesterol panel routinely.         Malignant melanoma of left eye (Multi) C69.92    Discussed at length she is very concerned about her vision and history related to her eyes.  Encouraged with patient to follow-up with specialist for additional evaluation/treatment options          Routine adult health maintenance - Primary Z00.00    Routine blood work ordered today for assessment purposes. Will continue to monitor as appropriate  -Cologaurd testing completed in January, 2025 and was negative. Will plan on retesting in 3 years (2028)  -Annual mammograms to be completed in January  -Bone density screening completed in January, 2025 and showed osteoporosis.  Patient reports intolerance to oral bisphosphonates and is hesitant to start Prolia or other injectables.  Encourage patient to follow-up with clinical pharmacy team later this month to discuss additional options moving forward         Relevant Orders    Comprehensive Metabolic Panel    TSH with reflex to Free T4 if abnormal    Lipid Panel    CBC and Auto Differential    Age-related osteoporosis without current pathological fracture M81.0    Bone density screening completed in January, 2025 and showed osteoporosis.  Patient reports intolerance to oral bisphosphonates and is hesitant to start Prolia or other injectables.  Encourage patient to follow-up with clinical pharmacy team later this month to discuss additional options moving forward          Other Visit Diagnoses         Codes      Screening for diabetes mellitus     Z13.1    Relevant Orders    Hemoglobin A1C       Blood glucose abnormal     R73.09    Relevant Orders    Hemoglobin A1C      Screening for thyroid disorder     Z13.29    Relevant Orders    TSH with reflex to Free T4 if abnormal      Screening for cholesterol level     Z13.220    Relevant Orders    Lipid Panel      Encounter for lipid screening for cardiovascular disease     Z13.220, Z13.6    Relevant Orders    Lipid Panel      Encounter for vitamin deficiency screening     Z13.21    Relevant Orders    Vitamin D 25-Hydroxy,Total (for eval of Vitamin D levels)      On long term drug therapy     Z79.899    Relevant Orders    Vitamin D 25-Hydroxy,Total (for eval of Vitamin D levels)      Vitamin D deficiency     E55.9    Relevant Orders    Vitamin D 25-Hydroxy,Total (for eval of Vitamin D levels)      Hypercalcemia     E83.52    Relevant Orders    Comprehensive Metabolic Panel    PTH, intact

## 2025-05-05 NOTE — PATIENT INSTRUCTIONS
Please arrange for routine follow up in 3 months. You may schedule on-line through [employer] or call our office at [phone].     Orders are in place for you to have fasting blood work completed prior to your next appointment. Please do not eat or drink 8 hours prior to having your blood drawn.   You may have your blood work completed in this building through [employer] ([address] [facility] [address], [place] [id number]).  For this location, you may schedule an appointment online or drop-in for walk-in services. [email or web address]  Hours:   Monday - Friday: 7:30 a.m. - 5 p.m. and Saturday: 8 a.m. - 11 a.m.  Phone:  [phone]

## 2025-05-09 ENCOUNTER — APPOINTMENT (OUTPATIENT)
Dept: PRIMARY CARE | Facility: CLINIC | Age: 65
End: 2025-05-09
Payer: MEDICARE

## 2025-05-13 ENCOUNTER — APPOINTMENT (OUTPATIENT)
Dept: PHARMACY | Facility: HOSPITAL | Age: 65
End: 2025-05-13
Payer: MEDICARE

## 2025-05-13 ENCOUNTER — TELEPHONE (OUTPATIENT)
Dept: PRIMARY CARE | Facility: CLINIC | Age: 65
End: 2025-05-13

## 2025-05-13 DIAGNOSIS — I10 ESSENTIAL HYPERTENSION, BENIGN: ICD-10-CM

## 2025-05-13 DIAGNOSIS — E11.65 TYPE 2 DIABETES MELLITUS WITH HYPERGLYCEMIA, WITHOUT LONG-TERM CURRENT USE OF INSULIN: Primary | ICD-10-CM

## 2025-05-13 DIAGNOSIS — E03.9 ACQUIRED HYPOTHYROIDISM: ICD-10-CM

## 2025-05-13 DIAGNOSIS — M81.0 AGE-RELATED OSTEOPOROSIS WITHOUT CURRENT PATHOLOGICAL FRACTURE: ICD-10-CM

## 2025-05-13 RX ORDER — LOSARTAN POTASSIUM 50 MG/1
50 TABLET ORAL DAILY
Qty: 90 TABLET | Refills: 0 | Status: SHIPPED | OUTPATIENT
Start: 2025-05-13 | End: 2025-11-09

## 2025-05-13 RX ORDER — LEVOTHYROXINE SODIUM 50 UG/1
50 TABLET ORAL
Qty: 90 TABLET | Refills: 0 | Status: SHIPPED | OUTPATIENT
Start: 2025-05-13

## 2025-05-13 NOTE — PROGRESS NOTES
"Outpatient Clinical Pharmacy Visit  Erin Amin is a 65 y.o. female who was referred to the ambulatory Clinical Pharmacy Team for their Diabetes.    Referring Provider: LIV Leblanc-CNP    Medication Access  Cost barriers: N/A  Enrolled in  PAP: Yes, expires 5/2/2026 (Farxiga)  Manages own medication: Yes  Transportation to the pharmacy: Yes  Frequency of missed doses: Rare     Last Visit  At last Clinical Pharmacy visit, continued Farxiga due to  Patient Assistance Program approval.     In the interim, A1c improved to 6.1% as of 5/5/25. (Previously 6.5%)     Diabetes Assessment  Today, Erin notes that she has been changing the timing of her metformin IR, which has been helping with GI side effects. Most recently, her PCP Cathy Puente CNP, recommended and prescribed metformin ER to assist with stomach side effects, however she has not yet started.      Current Diabetes Medication Regimen    Metformin  mg, 2 tabs BID  Farxiga 10 mg daily     Secondary Prevention  Statin? Yes, describe: rosuvastatin 10 mg daily  ACE-I/ARB? Yes, describe: losartan 50 mg daily  Aspirin? No     Pertinent PMH Review:  PMH of Pancreatitis: N/A  PMH of Retinopathy: No, however extensive vision issues due to previous medical history  PMH/FH of Medullary Thyroid Carcinoma or Multiple Endocrine Neoplasia Type 2: N/A  PMH of Urinary Tract Infections: No     Health Maintenance:   Foot Exam: Not discussed due to time  Eye Exam: 2022- another eye exam scheduled 5/14/24  Lipid Panel: LDL 54 mg/dL and triglycerides 143 mg/dL as of 3/21/24     DIET: She reports that her diet has not been \"as good\" as normal and needs to improve her eating habits.   EXERCISE: Not discussed due to time     Current monitoring regimen:   Patient is using: glucometer  Testing frequency: Cannot see to test BG  Barriers to testing: Cannot see to test BG     Patient-Reported Blood Glucose:  Not testing due to eye testing- recent A1c of 6.1% 5/5/25   "   Has the patient experienced any low sugars since last contact? Not discussed due to time  Unknown symptoms of low blood glucose: sweaty, shaky, anxious, excessive hunger, confusion, lightheaded, nauseous, blurred vision  Has the patient experienced any high sugars since last contact? Not discussed due to time  Unknown symptoms of high blood glucose: excessive thirst, frequent urination, fatigue, nausea, shortness of breath, trouble concentrating    Osteoporosis  Brief discussion today with Erin regarding need to evaluate for starting osteoporosis medications. Discussed high-level differences between Evenity, Prolia, Reclast, and Fosamax. Will have a separate follow-up visit to discuss with patient in full.     Laboratory Results  Lab Results   Component Value Date    BILITOT 0.6 01/17/2025    CALCIUM 10.1 01/17/2025    CO2 25 01/17/2025     01/17/2025    CREATININE 0.81 01/17/2025    GLUCOSE 112 (H) 01/17/2025    ALKPHOS 68 01/17/2025    K 5.0 01/17/2025    PROT 7.0 01/17/2025     01/17/2025    AST 20 01/17/2025    ALT 24 01/17/2025    BUN 18 01/17/2025    ANIONGAP 17 01/17/2025    ALBUMIN 5.0 01/17/2025    GFRF >90 09/18/2023    EGFR 81 01/17/2025     Lab Results   Component Value Date    TRIG 105 10/03/2024    CHOL 103 10/03/2024    HDL 39.9 10/03/2024     Lab Results   Component Value Date    HGBA1C 6.1 05/05/2025       Assessment/Plan   Problem List Items Addressed This Visit       Type 2 diabetes mellitus with hyperglycemia, without long-term current use of insulin - Primary    Relevant Orders    Referral to Clinical Pharmacy    Age-related osteoporosis without current pathological fracture    Relevant Orders    Referral to Clinical Pharmacy     Assessment  Erin's diabetes is currently well controlled, as evidenced by most recent A1c of 6.1% collected on 5/5/25. (Goal A1c <7%)  Will continue to monitor if metformin ER improves GI side effects.   Will plan separate visit to follow-up on  osteoporosis considerations.      Plan/Changes   Continue all meds under the continuation of care with the referring provider and clinical pharmacy team  Patient to start Metformin  mg, 2 tabs BID  Continue Farxiga 10 mg daily    Next PCP Follow-Up  8/22/25    Next Clinical Pharmacy Follow-Up  5/22/25  Review osteoporosis      Dorcas Handley, Krysta     Verbal consent to manage patient's drug therapy was obtained from the patient. They were informed they may decline to participate or withdraw from participation in pharmacy services at any time.

## 2025-05-13 NOTE — TELEPHONE ENCOUNTER
MEDICATION REFILL    Pharmacy Name:    [facility]  Medication requested         Levothyroxine   50 mcg   1 x daily        Losartan       50 mg      1 x daily  Dosage [see above]  Quantity      90 days   Allergies    nka  Date of last visit   05/05/2025  Date of Next Appointment   08/22/2025

## 2025-05-13 NOTE — Clinical Note
Blood glucose appears stable, however not enough time to address osteoporosis at this visit- follow-up appt has been made to address completely!

## 2025-05-22 ENCOUNTER — APPOINTMENT (OUTPATIENT)
Dept: PHARMACY | Facility: HOSPITAL | Age: 65
End: 2025-05-22
Payer: MEDICARE

## 2025-05-22 DIAGNOSIS — M81.0 AGE-RELATED OSTEOPOROSIS WITHOUT CURRENT PATHOLOGICAL FRACTURE: Primary | ICD-10-CM

## 2025-05-22 NOTE — PROGRESS NOTES
Outpatient Clinical Pharmacy Visit  Eirn Amin is a 65 y.o. female who was referred to the ambulatory Clinical Pharmacy Team for her osteoporosis management and to evaluate for initiation/continuation of Prolia.    Referring Provider: SORAYA Leblanc    Subjective   Allergies[1]    Medication Access  Cost barriers: N/A  Enrolled in  PAP: Yes, expires 5/2/2026 (Farxiga)  Manages own medication: Yes  Transportation to the pharmacy: Yes  Frequency of missed doses: Rare    Osteoporosis Pharmacotherapy Assessment  Today, discussed with Erin possible options for osteoporosis management.    Current Osteoporosis Pharmacotherapy  None  Calcium none  Vitamin D3 2000  Estrogen therapy: Not currently    Historical Osteoporosis Pharmacotherapy  Fosamax- Was bothering her stomach    Bone Health Evaluation  DEXA Results   Results as of 1/31/25  Spine L1-L4 T-score: -3.4  Left femur total T-score: -3.5  Left femur neck T-score: -2.6    WHO Criteria for Osteoporosis   Category T-Score   Normal >= -1.0 SD   Osteopenia <-1.0 and >-2.5   Osteoporosis <=-2.5   Severe or Established Osteoporosis <=-2.5 and the presence of one or more fragility fractures     Fracture History  Fragility fractures: No  Vertebral fractures: No  Historic fracture of pinky finger (~40 years ago)    Lifestyle  Alcohol: 1 glass of wine every 2-3 months  Smoking: No    Additional Considerations  Pregnant: No  Planned upcoming dental/oral procedures:   Last dental cleaning: ~4 months ago  Next dental cleaning: October 2025  Denies any upcoming dental procedures  Patient is at risk for infection due to immunosuppression, sickness, skin wounds, etc.: No    Laboratory Results  Lab Results   Component Value Date    BILITOT 0.6 01/17/2025    CALCIUM 10.1 01/17/2025    CO2 25 01/17/2025     01/17/2025    CREATININE 0.81 01/17/2025    GLUCOSE 112 (H) 01/17/2025    ALKPHOS 68 01/17/2025    K 5.0 01/17/2025    PROT 7.0 01/17/2025     01/17/2025  "   AST 20 01/17/2025    ALT 24 01/17/2025    BUN 18 01/17/2025    ANIONGAP 17 01/17/2025    ALBUMIN 5.0 01/17/2025    GFRF >90 09/18/2023    EGFR 81 01/17/2025     Lab Results   Component Value Date    TRIG 105 10/03/2024    CHOL 103 10/03/2024    LDLCALC 42 10/03/2024    HDL 39.9 10/03/2024     Lab Results   Component Value Date    HGBA1C 6.1 05/05/2025    HGBA1C 6.5 (H) 01/17/2025    HGBA1C 6.6 (A) 01/17/2025     No results found for: \"[id number]\"   Lab Results   Component Value Date    VITD25 35 10/03/2024       Assessment/Plan   Problem List Items Addressed This Visit    None    General Patient Discussion  Erin's bone density is currently classified as severe osteoporosis, as evidenced by lowest T-score of -3.5 observed in the left femur (total), per most recent DEXA scan from 1/2025. As such, it is appropriate for her to be started on osteoporosis pharmacotherapy at this time.    Due to severity of osteoporosis, would like to consider Evenity, however, will require a Prior Authorization.   Notably, Evenity is contraindicated if Erin has had a stroke/heart attack within the last year. Although she has a history of three heart attacks from 1714-2071 and a stroke in 2009, these have not been within the last year. As such, she is still a candidate for Evenity therapy.    Plan/Changes   Continue all meds under the continuation of care with the referring provider and clinical pharmacy team  Start Evenity Prior Authorization, as currently requires a PA.     Next PCP Follow-Up  8/2025    Next Clinical Pharmacy Follow-Up  ~2-3 weeks  Follow-up on Evenity PA      Dorcas Handley PharmD     Verbal consent to manage patient's drug therapy was obtained from the patient. They were informed they may decline to participate or withdraw from participation in pharmacy services at any time.         [1]   Allergies  Allergen Reactions    Cat Dander Runny nose     sneezing     "

## 2025-06-04 ENCOUNTER — TELEPHONE (OUTPATIENT)
Dept: PRIMARY CARE | Facility: CLINIC | Age: 65
End: 2025-06-04
Payer: MEDICARE

## 2025-06-04 DIAGNOSIS — H52.203 ASTIGMATISM OF BOTH EYES, UNSPECIFIED TYPE: ICD-10-CM

## 2025-06-04 DIAGNOSIS — H52.7 REFRACTION ERROR: ICD-10-CM

## 2025-06-04 DIAGNOSIS — H51.9 EYE MOVEMENT ABNORMALITY: ICD-10-CM

## 2025-06-04 DIAGNOSIS — H51.9 ABNORMAL EYE MOVEMENTS: Primary | ICD-10-CM

## 2025-06-04 DIAGNOSIS — H81.4 VERTIGO OF CENTRAL ORIGIN, UNSPECIFIED LATERALITY: ICD-10-CM

## 2025-06-04 DIAGNOSIS — C69.92 MALIGNANT MELANOMA OF LEFT EYE (MULTI): ICD-10-CM

## 2025-06-04 DIAGNOSIS — H31.002 RETINAL SCAR OF LEFT EYE: ICD-10-CM

## 2025-06-04 DIAGNOSIS — H49.883 EXTERNAL OPHTHALMOPLEGIA OF BOTH EYES: ICD-10-CM

## 2025-06-04 DIAGNOSIS — H52.02 HYPEROPIA OF LEFT EYE: ICD-10-CM

## 2025-06-04 NOTE — TELEPHONE ENCOUNTER
Pt states she is having issues with her eyes again.  She has not had any relief with the issue seeing her eye doctor.  She would like to speak with, update, AVIVA, CNP in regards to her past eye condition issues.  She is feeling very upset about this issue coming up again, she was doing better.

## 2025-06-05 NOTE — TELEPHONE ENCOUNTER
Spoke with patient about chronic eye concerns which patient feels has been getting steadily worse over time.  Patient does have some significant anxiety regarding her eyes as she is concerned that there will be no treatment options available or she will permanently lose her eyesight.  Patient is agreeable to follow-up with neuro-ophthalmology due to multiple complex eye conditions.  Referral placed at this time.  Patient to notify provider for any persistent/worsening vision/neurological concerns

## 2025-06-11 ENCOUNTER — APPOINTMENT (OUTPATIENT)
Dept: PHARMACY | Facility: HOSPITAL | Age: 65
End: 2025-06-11
Payer: MEDICARE

## 2025-06-11 PROCEDURE — RXMED WILLOW AMBULATORY MEDICATION CHARGE

## 2025-06-11 RX ORDER — ROMOSOZUMAB-AQQG 105 MG/1.17ML
210 INJECTION, SOLUTION SUBCUTANEOUS
Qty: 2 EACH | Refills: 11 | Status: SHIPPED | OUTPATIENT
Start: 2025-06-11 | End: 2025-06-16

## 2025-06-14 ENCOUNTER — PHARMACY VISIT (OUTPATIENT)
Dept: PHARMACY | Facility: CLINIC | Age: 65
End: 2025-06-14
Payer: MEDICARE

## 2025-06-16 ENCOUNTER — APPOINTMENT (OUTPATIENT)
Dept: PHARMACY | Facility: HOSPITAL | Age: 65
End: 2025-06-16
Payer: MEDICARE

## 2025-06-16 DIAGNOSIS — M81.0 AGE-RELATED OSTEOPOROSIS WITHOUT CURRENT PATHOLOGICAL FRACTURE: Primary | ICD-10-CM

## 2025-06-16 RX ORDER — DENOSUMAB 60 MG/ML
60 INJECTION SUBCUTANEOUS
Qty: 1 ML | Refills: 1 | Status: SHIPPED | OUTPATIENT
Start: 2025-06-16

## 2025-06-16 RX ORDER — CALCIUM CARBONATE 500(1250)
1 TABLET,CHEWABLE ORAL DAILY
Qty: 30 TABLET | Refills: 2 | Status: SHIPPED | OUTPATIENT
Start: 2025-06-16 | End: 2025-09-14

## 2025-06-16 NOTE — Clinical Note
So it looks like the Prior Auth was approved for Evenity, however patient is very doubtful that she can commit to a once-monthly injection schedule, due to her unpredictable health. I am okay with moving forward with Prolia at this time for her to receive in-office at next visit with you (8/2022). If after next year, density does not improve or worsens, I would much prefer considering Evenity at this time.   Let me know if you are agreeable to this plan- I strongly prefer Evenity, but won't be too helpful if patient is never available to receive.

## 2025-06-16 NOTE — PROGRESS NOTES
Outpatient Clinical Pharmacy Visit  Erin Amin is a 65 y.o. female who was referred to the ambulatory Clinical Pharmacy Team for their Osteoporosis.    Referring Provider: LIV Leblanc-CNP    Medication Access  Cost barriers: N/A  Enrolled in  PAP: Yes, expires 5/2/2026 ([facility])  Manages own medication: Yes  Transportation to the pharmacy: Yes  Frequency of missed doses: Rare    Last Visit/Interim  At last visit, started Prior Authorization process for Evenity.     In the interim, Prior Authorization for Evenity was approved!    Osteoporosis Pharmacotherapy Assessment  Today, discussed with Erin possible options for osteoporosis management in light of above approval for Evenity.     We had a long discussion regarding the risks, benefits, and mechanism of actions of both Evenity and Prolia separately. Although Evenity once monthly is the clinically superior option for Erin, she is doubtful that she will be able to commit to the once-monthly injection schedule, as her health often prevents her from traveling. As such, she would be more comfortable with pursuing Prolia.    Current Osteoporosis Pharmacotherapy  None  Calcium none  Vitamin D3 2000  Estrogen therapy: Not currently    Historical Osteoporosis Pharmacotherapy  Fosamax- Was bothering her stomach    Bone Health Evaluation  DEXA Results   Results as of 1/31/25  Spine L1-L4 T-score: -3.4  Left femur total T-score: -3.5  Left femur neck T-score: -2.6    WHO Criteria for Osteoporosis   Category T-Score   Normal >= -1.0 SD   Osteopenia <-1.0 and >-2.5   Osteoporosis <=-2.5   Severe or Established Osteoporosis <=-2.5 and the presence of one or more fragility fractures     Fracture History  Fragility fractures: No  Vertebral fractures: No  Historic fracture of pinky finger (~40 years ago)    Lifestyle  Alcohol: 1 glass of wine every 2-3 months  Smoking: No    Additional Considerations  Pregnant: No  Planned upcoming dental/oral procedures:   Last  "dental cleaning: ~4 months ago  Next dental cleaning: October 2025  Denies any upcoming dental procedures  Patient is at risk for infection due to immunosuppression, sickness, skin wounds, etc.: No    Laboratory Results  Lab Results   Component Value Date    BILITOT 0.6 01/17/2025    CALCIUM 10.1 01/17/2025    CO2 25 01/17/2025     01/17/2025    CREATININE 0.81 01/17/2025    GLUCOSE 112 (H) 01/17/2025    ALKPHOS 68 01/17/2025    K 5.0 01/17/2025    PROT 7.0 01/17/2025     01/17/2025    AST 20 01/17/2025    ALT 24 01/17/2025    BUN 18 01/17/2025    ANIONGAP 17 01/17/2025    ALBUMIN 5.0 01/17/2025    GFRF >90 09/18/2023    EGFR 81 01/17/2025     Lab Results   Component Value Date    TRIG 105 10/03/2024    CHOL 103 10/03/2024    HDL 39.9 10/03/2024     Lab Results   Component Value Date    HGBA1C 6.1 05/05/2025       Assessment/Plan   Problem List Items Addressed This Visit       Age-related osteoporosis without current pathological fracture - Primary    Relevant Orders    Referral to Clinical Pharmacy     Assessment  Erin's bone density is currently classified as severe osteoporosis, as evidenced by lowest T-score of -3.5 observed in the left femur (total), per most recent DEXA scan from 1/2025. As such, it is appropriate for her to be started on osteoporosis pharmacotherapy at this time.    Per above, although Evenity would be clinically superior, must consider \"real life\" commitment at this time. I am okay with pursuing Prolia for the next ~1 year, with reassessing DEXA after this time. If no improvement or worsening of bone density, despite Prolia pharmacotherapy, will seriously re-consider Evenity at this time.     Plan/Changes   Continue all meds under the continuation of care with the referring provider and clinical pharmacy team  Start Prolia 60 mg subcutaneously every 6 months.   Start calcium carbonate   Discussed  from levothyroxine by several hours- patient voiced " understanding  Patient does have baseline trouble taking pills- she is to call if difficulty taking calcium supplement, and we will revisit medication delivery options.   Start CMP, to be completed within 30 days before Prolia injection  VitD previously ordered by PCP 5/5/25- patient to complete, to be completed within 30 days before Prolia injection    Medication Counseling  Prolia Education  Mechanism of action: An antiresorptive agent  Dosing: Patient will receive a 60 mg subcutaneous injection every 6 months.   Duration: Anticipated to be long-term, unless patient transitions to an alternative agent for osteoporosis management.   Benefits of therapy  Risks of therapy:  Hypocalcemia  Osteonecrosis of the Jaw (ONJ) (rare)  Suppression of bone turnover, as evidenced by ONJ, atypical fractures, and/or delayed fracture healing  Common side effects  Anticipated response to therapy and monitoring: Calcium, bloodwork, DEXA scans, etc.  Administration plan: In-office, covered by  PAP  Encouraged regular weight bearing physical exercise  Answered all questions and addressed all concerns     Next PCP Follow-Up  8/22/2025    Next Clinical Pharmacy Follow-Up  ~2/2026  Schedule next injection of Prolia  Order labs      Dorcas Handley, PharmD     Verbal consent to manage patient's drug therapy was obtained from the patient. They were informed they may decline to participate or withdraw from participation in pharmacy services at any time.

## 2025-07-11 ENCOUNTER — TELEPHONE (OUTPATIENT)
Dept: PRIMARY CARE | Facility: CLINIC | Age: 65
End: 2025-07-11
Payer: MEDICARE

## 2025-07-11 DIAGNOSIS — Z95.1 S/P CABG (CORONARY ARTERY BYPASS GRAFT): ICD-10-CM

## 2025-07-11 RX ORDER — CLOPIDOGREL BISULFATE 75 MG/1
75 TABLET ORAL DAILY
Qty: 90 TABLET | Refills: 1 | Status: SHIPPED | OUTPATIENT
Start: 2025-07-11

## 2025-07-11 NOTE — TELEPHONE ENCOUNTER
Medication Refill    Pharmacy: [ [facility] ]    Medication: [ Clopidogrel 75 mg ]    Quantity: [ 90 day ]    LOV: [ 05/05/25 ]    NOV: [ 08/22/25 ]

## 2025-07-28 PROCEDURE — RXMED WILLOW AMBULATORY MEDICATION CHARGE

## 2025-07-30 ENCOUNTER — TELEPHONE (OUTPATIENT)
Dept: PRIMARY CARE | Facility: CLINIC | Age: 65
End: 2025-07-30
Payer: MEDICARE

## 2025-07-30 ENCOUNTER — PHARMACY VISIT (OUTPATIENT)
Dept: PHARMACY | Facility: CLINIC | Age: 65
End: 2025-07-30
Payer: MEDICARE

## 2025-07-30 NOTE — TELEPHONE ENCOUNTER
Pt states when she takes the calcium it upsets her stomach to where she cannot take her other meds. Pt would like to know what she should do with the upset stomach. Please advise pt [phone]

## 2025-07-31 NOTE — TELEPHONE ENCOUNTER
Spoke with pt, advised her of JM recommendation, see how things go through the weekend, told her if pain continues to call first thing Monday morning

## 2025-07-31 NOTE — TELEPHONE ENCOUNTER
Please encourage patient to trial a bland diet for the next several days avoiding fried/fatty foods, heavy foods or overly processed things.  Make sure she is staying hydrated.  This should hopefully help with her upset stomach.  If it does not, she will probably need to come in for a visit for any persistent/worsening GI concerns

## 2025-08-07 ENCOUNTER — TELEPHONE (OUTPATIENT)
Dept: PRIMARY CARE | Facility: CLINIC | Age: 65
End: 2025-08-07
Payer: MEDICARE

## 2025-08-07 NOTE — TELEPHONE ENCOUNTER
Pt states she stopped taking the calcium carbonate 500 mg about a week ago because it was making her sick. Pt states she would like to know if she still needs to do bloodwork before appt 08/22/2025 since she is no longer taking med. Pt would like to be called back at [phone].

## 2025-08-11 ENCOUNTER — TELEPHONE (OUTPATIENT)
Dept: PRIMARY CARE | Facility: CLINIC | Age: 65
End: 2025-08-11
Payer: MEDICARE

## 2025-08-11 DIAGNOSIS — E11.65 TYPE 2 DIABETES MELLITUS WITH HYPERGLYCEMIA, WITHOUT LONG-TERM CURRENT USE OF INSULIN: ICD-10-CM

## 2025-08-11 RX ORDER — METFORMIN HYDROCHLORIDE 500 MG/1
1000 TABLET, EXTENDED RELEASE ORAL
Qty: 360 TABLET | Refills: 1 | Status: SHIPPED | OUTPATIENT
Start: 2025-08-11 | End: 2025-11-09

## 2025-08-12 LAB
ALBUMIN SERPL-MCNC: 4.8 G/DL (ref 3.6–5.1)
ALP SERPL-CCNC: 65 U/L (ref 37–153)
ALT SERPL-CCNC: 19 U/L (ref 6–29)
ANION GAP SERPL CALCULATED.4IONS-SCNC: 7 MMOL/L (CALC) (ref 7–17)
AST SERPL-CCNC: 16 U/L (ref 10–35)
BILIRUB SERPL-MCNC: 0.5 MG/DL (ref 0.2–1.2)
BUN SERPL-MCNC: 19 MG/DL (ref 7–25)
CALCIUM SERPL-MCNC: 10.3 MG/DL (ref 8.6–10.4)
CHLORIDE SERPL-SCNC: 104 MMOL/L (ref 98–110)
CO2 SERPL-SCNC: 29 MMOL/L (ref 20–32)
CREAT SERPL-MCNC: 0.7 MG/DL (ref 0.5–1.05)
EGFRCR SERPLBLD CKD-EPI 2021: 97 ML/MIN/1.73M2
GLUCOSE SERPL-MCNC: 121 MG/DL (ref 65–99)
POTASSIUM SERPL-SCNC: 5.3 MMOL/L (ref 3.5–5.3)
PROT SERPL-MCNC: 6.9 G/DL (ref 6.1–8.1)
SODIUM SERPL-SCNC: 140 MMOL/L (ref 135–146)

## 2025-08-14 ENCOUNTER — RESULTS FOLLOW-UP (OUTPATIENT)
Dept: PRIMARY CARE | Facility: CLINIC | Age: 65
End: 2025-08-14
Payer: MEDICARE

## 2025-08-14 LAB
25(OH)D3+25(OH)D2 SERPL-MCNC: 51 NG/ML (ref 30–100)
BASOPHILS # BLD AUTO: 63 CELLS/UL (ref 0–200)
BASOPHILS NFR BLD AUTO: 0.8 %
CHOLEST SERPL-MCNC: 83 MG/DL
CHOLEST/HDLC SERPL: 2.3 (CALC)
EOSINOPHIL # BLD AUTO: 182 CELLS/UL (ref 15–500)
EOSINOPHIL NFR BLD AUTO: 2.3 %
ERYTHROCYTE [DISTWIDTH] IN BLOOD BY AUTOMATED COUNT: 12.1 % (ref 11–15)
EST. AVERAGE GLUCOSE BLD GHB EST-MCNC: 143 MG/DL
EST. AVERAGE GLUCOSE BLD GHB EST-SCNC: 7.9 MMOL/L
HBA1C MFR BLD: 6.6 %
HCT VFR BLD AUTO: 45.5 % (ref 35–45)
HDLC SERPL-MCNC: 36 MG/DL
HGB BLD-MCNC: 14.7 G/DL (ref 11.7–15.5)
LDLC SERPL CALC-MCNC: 29 MG/DL (CALC)
LYMPHOCYTES # BLD AUTO: 2781 CELLS/UL (ref 850–3900)
LYMPHOCYTES NFR BLD AUTO: 35.2 %
MCH RBC QN AUTO: 30.1 PG (ref 27–33)
MCHC RBC AUTO-ENTMCNC: 32.3 G/DL (ref 32–36)
MCV RBC AUTO: 93 FL (ref 80–100)
MONOCYTES # BLD AUTO: 585 CELLS/UL (ref 200–950)
MONOCYTES NFR BLD AUTO: 7.4 %
NEUTROPHILS # BLD AUTO: 4290 CELLS/UL (ref 1500–7800)
NEUTROPHILS NFR BLD AUTO: 54.3 %
NONHDLC SERPL-MCNC: 47 MG/DL (CALC)
PLATELET # BLD AUTO: 254 THOUSAND/UL (ref 140–400)
PMV BLD REES-ECKER: 10.2 FL (ref 7.5–12.5)
PTH-INTACT SERPL-MCNC: 27 PG/ML (ref 16–77)
RBC # BLD AUTO: 4.89 MILLION/UL (ref 3.8–5.1)
TRIGL SERPL-MCNC: 94 MG/DL
TSH SERPL-ACNC: 2.49 MIU/L (ref 0.4–4.5)
WBC # BLD AUTO: 7.9 THOUSAND/UL (ref 3.8–10.8)

## 2025-08-22 ENCOUNTER — APPOINTMENT (OUTPATIENT)
Dept: PRIMARY CARE | Facility: CLINIC | Age: 65
End: 2025-08-22
Payer: MEDICARE

## 2025-08-25 ENCOUNTER — TELEPHONE (OUTPATIENT)
Dept: PRIMARY CARE | Facility: CLINIC | Age: 65
End: 2025-08-25
Payer: MEDICARE

## 2025-08-25 DIAGNOSIS — I10 ESSENTIAL HYPERTENSION, BENIGN: ICD-10-CM

## 2025-08-25 DIAGNOSIS — E03.9 ACQUIRED HYPOTHYROIDISM: ICD-10-CM

## 2025-08-25 RX ORDER — LOSARTAN POTASSIUM 50 MG/1
50 TABLET ORAL DAILY
Qty: 90 TABLET | Refills: 1 | Status: SHIPPED | OUTPATIENT
Start: 2025-08-25 | End: 2026-02-21

## 2025-08-25 RX ORDER — LEVOTHYROXINE SODIUM 50 UG/1
50 TABLET ORAL
Qty: 90 TABLET | Refills: 1 | Status: SHIPPED | OUTPATIENT
Start: 2025-08-25

## 2025-11-18 ENCOUNTER — APPOINTMENT (OUTPATIENT)
Dept: PRIMARY CARE | Facility: CLINIC | Age: 65
End: 2025-11-18
Payer: MEDICARE

## 2026-01-13 ENCOUNTER — APPOINTMENT (OUTPATIENT)
Dept: PHARMACY | Facility: HOSPITAL | Age: 66
End: 2026-01-13
Payer: MEDICARE